# Patient Record
Sex: MALE | Race: WHITE | NOT HISPANIC OR LATINO | Employment: OTHER | URBAN - METROPOLITAN AREA
[De-identification: names, ages, dates, MRNs, and addresses within clinical notes are randomized per-mention and may not be internally consistent; named-entity substitution may affect disease eponyms.]

---

## 2017-01-13 PROBLEM — I63.00 CEREBROVASCULAR ACCIDENT (CVA) DUE TO THROMBOSIS OF PRECEREBRAL ARTERY: Status: ACTIVE | Noted: 2017-01-13

## 2017-01-13 PROBLEM — R53.1 ACUTE RIGHT-SIDED WEAKNESS: Status: ACTIVE | Noted: 2017-01-13

## 2017-01-13 PROBLEM — F02.80 PARKINSON'S DISEASE DEMENTIA: Status: ACTIVE | Noted: 2017-01-13

## 2017-01-13 PROBLEM — G20.A1 PARKINSON'S DISEASE DEMENTIA: Status: ACTIVE | Noted: 2017-01-13

## 2017-01-13 PROBLEM — R29.6 FALLS FREQUENTLY: Status: ACTIVE | Noted: 2017-01-13

## 2017-01-13 PROBLEM — I63.9 CVA (CEREBRAL VASCULAR ACCIDENT): Status: ACTIVE | Noted: 2017-01-13

## 2017-01-14 PROBLEM — E78.6 LIPOPROTEIN DEFICIENCIES: Status: ACTIVE | Noted: 2017-01-14

## 2017-01-14 PROBLEM — E78.1 PURE HYPERGLYCERIDEMIA: Status: ACTIVE | Noted: 2017-01-14

## 2017-01-15 PROBLEM — I21.4 NON-ST ELEVATION (NSTEMI) MYOCARDIAL INFARCTION: Status: ACTIVE | Noted: 2017-01-15

## 2017-01-18 PROBLEM — E87.6 HYPOKALEMIA: Status: ACTIVE | Noted: 2017-01-18

## 2017-01-23 PROBLEM — I21.4 NON-ST ELEVATION (NSTEMI) MYOCARDIAL INFARCTION: Status: ACTIVE | Noted: 2017-01-23

## 2017-01-23 PROBLEM — E87.6 HYPOKALEMIA: Status: ACTIVE | Noted: 2017-01-23

## 2017-01-27 PROBLEM — F02.80 PARKINSON'S DISEASE DEMENTIA: Status: RESOLVED | Noted: 2017-01-13 | Resolved: 2017-01-27

## 2017-01-27 PROBLEM — R07.9 CHEST PAIN: Status: ACTIVE | Noted: 2017-01-27

## 2017-01-27 PROBLEM — I21.A1 NON-ST ELEVATION MYOCARDIAL INFARCTION (NSTEMI), TYPE 2: Status: ACTIVE | Noted: 2017-01-23

## 2017-01-27 PROBLEM — G20.A1 PARKINSON'S DISEASE DEMENTIA: Status: RESOLVED | Noted: 2017-01-13 | Resolved: 2017-01-27

## 2017-02-16 PROBLEM — R04.0 SEVERE EPISTAXIS: Status: ACTIVE | Noted: 2017-02-16

## 2017-02-16 PROBLEM — R04.0 ANTERIOR EPISTAXIS: Status: ACTIVE | Noted: 2017-02-16

## 2017-08-15 ENCOUNTER — OFFICE VISIT (OUTPATIENT)
Dept: NEUROLOGY | Facility: CLINIC | Age: 77
End: 2017-08-15
Payer: MEDICARE

## 2017-08-15 VITALS
HEIGHT: 70 IN | HEART RATE: 64 BPM | WEIGHT: 175.94 LBS | TEMPERATURE: 98 F | DIASTOLIC BLOOD PRESSURE: 53 MMHG | BODY MASS INDEX: 25.19 KG/M2 | RESPIRATION RATE: 17 BRPM | SYSTOLIC BLOOD PRESSURE: 109 MMHG

## 2017-08-15 DIAGNOSIS — F02.80 EARLY ONSET ALZHEIMER'S DEMENTIA WITHOUT BEHAVIORAL DISTURBANCE: ICD-10-CM

## 2017-08-15 DIAGNOSIS — G30.0 EARLY ONSET ALZHEIMER'S DEMENTIA WITHOUT BEHAVIORAL DISTURBANCE: ICD-10-CM

## 2017-08-15 DIAGNOSIS — R27.0 ATAXIA: ICD-10-CM

## 2017-08-15 DIAGNOSIS — I63.9 BRAINSTEM INFARCTION: Primary | ICD-10-CM

## 2017-08-15 PROBLEM — G20.A1 PARKINSON'S DISEASE DEMENTIA: Status: RESOLVED | Noted: 2017-01-13 | Resolved: 2017-08-15

## 2017-08-15 PROCEDURE — 1157F ADVNC CARE PLAN IN RCRD: CPT | Mod: ,,, | Performed by: PSYCHIATRY & NEUROLOGY

## 2017-08-15 PROCEDURE — 99999 PR PBB SHADOW E&M-EST. PATIENT-LVL III: CPT | Mod: PBBFAC,,, | Performed by: PSYCHIATRY & NEUROLOGY

## 2017-08-15 PROCEDURE — 99215 OFFICE O/P EST HI 40 MIN: CPT | Mod: S$PBB,,, | Performed by: PSYCHIATRY & NEUROLOGY

## 2017-08-15 PROCEDURE — 3074F SYST BP LT 130 MM HG: CPT | Mod: ,,, | Performed by: PSYCHIATRY & NEUROLOGY

## 2017-08-15 PROCEDURE — 3008F BODY MASS INDEX DOCD: CPT | Mod: ,,, | Performed by: PSYCHIATRY & NEUROLOGY

## 2017-08-15 PROCEDURE — 1126F AMNT PAIN NOTED NONE PRSNT: CPT | Mod: ,,, | Performed by: PSYCHIATRY & NEUROLOGY

## 2017-08-15 PROCEDURE — 99213 OFFICE O/P EST LOW 20 MIN: CPT | Mod: PBBFAC,PN | Performed by: PSYCHIATRY & NEUROLOGY

## 2017-08-15 PROCEDURE — 3078F DIAST BP <80 MM HG: CPT | Mod: ,,, | Performed by: PSYCHIATRY & NEUROLOGY

## 2017-08-15 PROCEDURE — 1159F MED LIST DOCD IN RCRD: CPT | Mod: ,,, | Performed by: PSYCHIATRY & NEUROLOGY

## 2017-08-15 RX ORDER — DONEPEZIL HYDROCHLORIDE 10 MG/1
5 TABLET, FILM COATED ORAL NIGHTLY
Qty: 90 TABLET | Refills: 3 | Status: SHIPPED | OUTPATIENT
Start: 2017-08-15 | End: 2017-10-06

## 2017-08-15 NOTE — ASSESSMENT & PLAN NOTE
Continue Plavix, Lipitor however given his high risk of falls and the duration of time since infarct will stop dual antiplatelet therapy.  DC aspirin

## 2017-08-15 NOTE — ASSESSMENT & PLAN NOTE
Still on starting doses of Aricept and Namenda.  We will increase Aricept from 5-10 mg at night.  As long as this is tolerated, recommend gradual titration of Namenda by 5 mg a week to a target of 10 mg twice a day which his maintenance dosing.    Agree with colleagues in neurology who saw him earlier in the year, I do not see any tremor, rigidity, or asymmetric parkinsonian symptoms to suggest Parkinson's disease.  Agree with discontinuation of carbidopa levodopa

## 2017-08-15 NOTE — PATIENT INSTRUCTIONS
We will stop the aspirin, and continue only on the plavix 75 mg once a day for your recent stroke    Be very careful about your balance - I suspect that in addition to pre-existing balance issues, the recent stroke has impaired your balance even more.  Should have supervision and assistance with all transfers.    Increase Aricept from 5 mg to 10 mg at night.  As long as this is tolerated, can increase the memantine as well in the future, to a target of 10 mg twice a day

## 2017-08-15 NOTE — LETTER
August 15, 2017      MORRIS Jefferson MD  [address] [facility] [address]           [facility]  [address] [facility] [address] [place] [address]  Phone: [phone]  Fax: [phone]          Patient: Brown García   MR Number: [id number]   YOB: 1940   Date of Visit: 8/15/2017       Dear Dr. MORRIS Jefferson:    Thank you for referring Brown García to me for evaluation. Attached you will find relevant portions of my assessment and plan of care.    If you have questions, please do not hesitate to call me. I look forward to following Brown García along with you.    Sincerely,    Devin Ayoub,     Enclosure  CC:  No Recipients    If you would like to receive this communication electronically, please contact [email or web address] or [phone] to request more information on [employer] access.    For providers and/or their staff who would like to refer a patient to [facility], please contact us through our one-stop-shop provider referral line, [facility] , at [phone].    If you feel you have received this communication in error or would no longer like to receive these types of communications, please e-mail [email or web address]

## 2017-08-15 NOTE — ASSESSMENT & PLAN NOTE
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

## 2017-08-15 NOTE — PROGRESS NOTES
Subjective:      8/15/2017       Patient ID: Brown García is a right handed 77 y.o.  male who presents for falls, dementia, muscle weakness, Parkinson's disease    History of Present Illness    Reviewed records in Epic.  I do not have any notes from his referring physician.  He is here today with his son.    The patient is a resident at [facility].  He was seen in the emergency room at [facility] on several occasions in May 2017 with falls.  CT scan of the brain was performed which showed diffuse atrophy, no acute findings; concerning region for possible microhemorrhage likely just a vascular structure.  Neurosurgery looked at that at the time.    Moved to [facility] in Nov 2016, prior to that was living alone, falling.  Seemed to be accelerating, unable to stop himself.  Dx with PD and placed on Sinemet. Son states he never had any tremor, and since beginning of the year taken off Sinemet.  No change.     Had MI, stroke in Jan 2017.  Stents, taken off sinemet at that time. Was seen by Dr. Lopez and Dr. Juárez, taken off sinemet, started on plavix, aricept and memantine.     Still has some falls, has a safety mat by his bed at .     Review of patient's allergies indicates:   Allergen Reactions    Pcn [penicillins]      Current Outpatient Prescriptions   Medication Sig Dispense Refill    atorvastatin (LIPITOR) 40 MG tablet Take 1 tablet (40 mg total) by mouth once daily. 90 tablet 3    busPIRone (BUSPAR) 2.5 MG Tab tablet Take 2.5 mg by mouth 2 (two) times daily.      butalbital-acetaminophen-caffeine -40 mg (FIORICET, ESGIC) -40 mg per tablet Take 1 tablet by mouth daily as needed for Pain.      carvedilol (COREG) 6.25 MG tablet Take 6.25 mg by mouth 2 (two) times daily with meals.      cholecalciferol, vitamin D3, (VITAMIN D3) 1,000 unit Chew Take 1 tablet by mouth once daily.      clopidogrel (PLAVIX) 75 mg tablet Take 1 tablet (75 mg total) by mouth once daily.       cyanocobalamin (VITAMIN B-12) 1000 MCG tablet Take 100 mcg by mouth once daily.      donepezil (ARICEPT) 10 MG tablet Take 0.5 tablets (5 mg total) by mouth every evening. 90 tablet 3    escitalopram oxalate (LEXAPRO) 10 MG tablet Take 10 mg by mouth once daily.      fenofibrate micronized (LOFIBRA) 200 MG Cap Take 200 mg by mouth daily with breakfast.      ferrous sulfate 325 (65 FE) MG EC tablet Take 325 mg by mouth 2 (two) times daily.      furosemide (LASIX) 20 MG tablet Take 1 tablet (20 mg total) by mouth once daily. 30 tablet 11    levothyroxine (SYNTHROID) 137 MCG Tab tablet Take 137 mcg by mouth before breakfast.      lisinopril (PRINIVIL,ZESTRIL) 2.5 MG tablet Take 2 tablets (5 mg total) by mouth once daily. 180 tablet 3    lorazepam (ATIVAN) 0.5 MG tablet Take 1 tablet (0.5 mg total) by mouth every 12 (twelve) hours as needed. 30 tablet 0    lubiprostone (AMITIZA) 24 MCG Cap Take 24 mcg by mouth every 12 (twelve) hours as needed.      melatonin 10 mg Tab Take 10 mg by mouth every evening.       memantine (NAMENDA) 5 MG Tab Take 1 tablet (5 mg total) by mouth every evening. 30 tablet 11    metformin (GLUCOPHAGE) 500 MG tablet Take 1,000 mg by mouth 2 (two) times daily with meals.       multivitamin capsule Take 1 capsule by mouth once daily.      ondansetron (ZOFRAN-ODT) 4 MG TbDL Take 8 mg by mouth every 8 (eight) hours as needed.      pantoprazole (PROTONIX) 40 MG tablet Take 1 tablet (40 mg total) by mouth once daily.      sodium chloride (SALINE NASAL MIST) 0.65 % nasal spray 1 spray by Nasal route every 4 (four) hours. (Patient taking differently: 1 spray by Nasal route 4 (four) times daily. )  12    temazepam (RESTORIL) 15 mg Cap Take 15 mg by mouth every evening.       No current facility-administered medications for this visit.        Past Medical History  Past Medical History:   Diagnosis Date    Anxiety     Cognitive communication deficit     Coronary atherosclerosis of native  coronary artery 3/27/2013    9/2000. CABG. 2/24/2005. Cath. [place]. 3V CAD. Patent LIMA to LAD. Patent SVG to OM1. Occluded RCA. LVEF 65%.    Depression     Diabetes mellitus type II 3/27/2013    Diagnosed 2002.    Dizziness and giddiness     Essential hypertension, benign 3/27/2013    Diagnosed 1990.    Frequent falls     Gastroparesis     GERD (gastroesophageal reflux disease)     Hx of CABG 3/27/2013    9/2000. CABG. 2/24/2005. Cath. [place]. 3V CAD. Patent LIMA to LAD. Patent SVG to OM1. Occluded RCA. LVEF 65%.    Hypercholesterolemia 3/27/2013    Incontinence of urine     Insomnia     Irritable bowel syndrome (IBS)     Parkinson's disease     Requires daily assistance for activities of daily living (ADL) and comfort needs     Stroke     thrombos    Thyroid disease     Unsteady gait        Past Surgical History  Past Surgical History:   Procedure Laterality Date    CARDIAC CATHETERIZATION      CORONARY ARTERY BYPASS GRAFT      HAND SURGERY  2009    KNEE SURGERY  1/2013    Left TKR       Family History  History reviewed. No pertinent family history.    Social History  Social History     Social History    Marital status:      Spouse name: N/A    Number of children: N/A    Years of education: N/A     Occupational History    Not on file.     Social History Main Topics    Smoking status: Never Smoker    Smokeless tobacco: Never Used    Alcohol use No    Drug use: No    Sexual activity: Not on file     Other Topics Concern    Not on file     Social History Narrative    No narrative on file        Review of Systems  Review of Systems   Constitutional: Positive for appetite change and unexpected weight change (loss). Negative for fatigue and fever.   HENT: Positive for nosebleeds. Negative for congestion, hearing loss, sinus pressure and trouble swallowing.    Eyes: Negative for pain and visual disturbance.   Respiratory: Negative for cough, shortness of breath and wheezing.     Cardiovascular: Negative for chest pain and palpitations.   Gastrointestinal: Positive for nausea and vomiting. Negative for abdominal pain, blood in stool and diarrhea.   Endocrine: Negative for cold intolerance and heat intolerance.   Genitourinary: Negative for difficulty urinating, hematuria and urgency.        Incontinence     Musculoskeletal: Negative for arthralgias, back pain, gait problem, myalgias and neck pain.   Skin: Negative for rash and wound.   Neurological: Negative for dizziness, seizures, weakness and numbness.   Hematological: Bruises/bleeds easily.   Psychiatric/Behavioral: Negative for decreased concentration, dysphoric mood and sleep disturbance.       Objective:        Vitals:    08/15/17 0855   BP: (!) 109/53   Pulse: 64   Resp: 17   Temp: 97.5 °F (36.4 °C)     Body mass index is 25.24 kg/m².  Neurologic Exam     Mental Status   Oriented to person.   Disoriented to place.   Disoriented to time.   Registration: recalls 3 of 3 objects. Recall of objects at 5 minutes: 0/3.   Attention: decreased. Concentration: decreased.   Speech: speech is normal   Level of consciousness: alert  Knowledge: poor.   Able to name object. Able to repeat. Normal comprehension.   In [facility]  Friday, October, 2091     Sensory Exam   Light touch normal.   Right arm vibration: normal  Left arm vibration: normal  Right leg vibration: decreased from toes  Left leg vibration: decreased from toes  Right leg pinprick: decreased from toes  Left leg pinprick: decreased from toes  Some decreased sharp/dull discrimination at the toes     Gait, Coordination, and Reflexes     Gait  Gait: wide-based    Coordination   Romberg: positive    Reflexes   Right brachioradialis: 0  Left brachioradialis: 0  Right biceps: 0  Left biceps: 0  Right triceps: 0  Left triceps: 0  Right patellar: 2+  Left patellar: 2+  Right achilles: 1+  Left achilles: 0Leans hard to the left with narrow base or walking  No  saccade on head thrust, no skew deviation       Physical Exam   Neurological: He has an abnormal Romberg Test.   Reflex Scores:       Tricep reflexes are 0 on the right side and 0 on the left side.       Bicep reflexes are 0 on the right side and 0 on the left side.       Brachioradialis reflexes are 0 on the right side and 0 on the left side.       Patellar reflexes are 2+ on the right side and 2+ on the left side.       Achilles reflexes are 1+ on the right side and 0 on the left side.  Psychiatric: His speech is normal.         Data Review:     Reviewed brain imaging including CT scans from May 2017, and MRI of the brain January 2017.  Reviewed inpatient neurology consultation records from Dr. Lopez and Dr. Carrington Queen    Assessment:       1. Brainstem infarction    2. Ataxia    3. Early onset Alzheimer's dementia without behavioral disturbance        Plan:         Problem List Items Addressed This Visit        Neuro    Brainstem infarction - Primary    Overview     Midline central medullary infarct         Current Assessment & Plan     Continue Plavix, Lipitor however given his high risk of falls and the duration of time since infarct will stop dual antiplatelet therapy.  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         Early onset Alzheimer's dementia without behavioral disturbance    Current Assessment & Plan     Still on starting doses of Aricept and Namenda.  We will increase Aricept from 5-10 mg at night.  As long as this is tolerated, recommend gradual titration of Namenda by 5 mg a week to a target of 10 mg twice a day which his maintenance dosing.    Agree with colleagues in neurology who saw him earlier in the year, I do not see any tremor, rigidity, or asymmetric parkinsonian symptoms to suggest Parkinson's disease.  Agree with  discontinuation of carbidopa levodopa           Other Visit Diagnoses    None.         Return in about 3 months (around 11/15/2017).        Patient information shared at the time of visit:  We will stop the aspirin, and continue only on the plavix 75 mg once a day for your recent stroke    Be very careful about your balance - I suspect that in addition to pre-existing balance issues, the recent stroke has impaired your balance even more.  Should have supervision and assistance with all transfers.    Increase Aricept from 5 mg to 10 mg at night.  As long as this is tolerated, can increase the memantine as well in the future, to a target of 10 mg twice a day    Thank you very much for the opportunity to assist in this patient's care.  If you have any questions or concerns, please do not hesitate to contact me at any time.     Sincerely,  Devin Ayoub, DO

## 2017-09-29 ENCOUNTER — OFFICE VISIT (OUTPATIENT)
Dept: OPTOMETRY | Facility: CLINIC | Age: 77
End: 2017-09-29
Payer: MEDICARE

## 2017-09-29 DIAGNOSIS — H25.13 NUCLEAR SCLEROSIS, BILATERAL: ICD-10-CM

## 2017-09-29 DIAGNOSIS — E11.9 DIABETES MELLITUS TYPE 2 WITHOUT RETINOPATHY: Primary | ICD-10-CM

## 2017-09-29 DIAGNOSIS — H40.039 ANATOMICAL NARROW ANGLE: ICD-10-CM

## 2017-09-29 DIAGNOSIS — H52.03 HYPEROPIA WITH ASTIGMATISM AND PRESBYOPIA, BILATERAL: ICD-10-CM

## 2017-09-29 DIAGNOSIS — H02.533 LID RETRACTION, RIGHT: ICD-10-CM

## 2017-09-29 DIAGNOSIS — H52.4 HYPEROPIA WITH ASTIGMATISM AND PRESBYOPIA, BILATERAL: ICD-10-CM

## 2017-09-29 DIAGNOSIS — H52.203 HYPEROPIA WITH ASTIGMATISM AND PRESBYOPIA, BILATERAL: ICD-10-CM

## 2017-09-29 DIAGNOSIS — H43.813 POSTERIOR VITREOUS DETACHMENT, BILATERAL: ICD-10-CM

## 2017-09-29 DIAGNOSIS — Z13.5 GLAUCOMA SCREENING: ICD-10-CM

## 2017-09-29 PROCEDURE — 92004 COMPRE OPH EXAM NEW PT 1/>: CPT | Mod: S$PBB,,, | Performed by: OPTOMETRIST

## 2017-09-29 PROCEDURE — 92015 DETERMINE REFRACTIVE STATE: CPT | Mod: ,,, | Performed by: OPTOMETRIST

## 2017-09-29 PROCEDURE — 99999 PR PBB SHADOW E&M-EST. PATIENT-LVL III: CPT | Mod: PBBFAC,,, | Performed by: OPTOMETRIST

## 2017-09-29 PROCEDURE — 99213 OFFICE O/P EST LOW 20 MIN: CPT | Mod: PBBFAC,PO | Performed by: OPTOMETRIST

## 2017-09-29 NOTE — PATIENT INSTRUCTIONS
"Early Cataracts--not visually significant for surgery consultation.    What Are Cataracts?  A clear lens in the eye focuses light. This lets the eye see images sharply. With age, the lens slowly becomes cloudy. The cloudy lens is a cataract. A cataract scatters light and makes it hard for the eye to focus. Cataracts often form in both eyes. But one lens may cloud faster than the other.      The Aging of Your Lens    Your lens may cloud so slowly that you don`t notice any vision changes at first. But as the cataract gets worse, the eye has a harder time focusing. In early stages, glasses may help you see better. As the lens gets cloudier, your doctor may recommend surgery to restore your vision.  FLASHES / FLOATERS / POSTERIOR VITREOUS DETACHMENT    Call the clinic if you have any further changes in symptoms.  Including:  Increased numbers of floaters or flashing lights, dimness or darkness that moves through or stays constant in your vision, or any pain in the eye (s).            DRY EYES:  Use Over The Counter artificial tears as needed for dry eye symptoms.  Some common brands include:  Systane, Optive, and Refresh.  These drops can be used as frequently as desired, but may be most helpful use during long periods of concentrated work.  For example, reading / working at the computer.  Avoid drops that "get redness out", as these contain medication that may further irritate the eyes.    ALLERGY EYES / SYMPTOMS:    Over the counter medications include--Zaditor and Alaway  Use as directed 1-2 drops daily for symptoms of itching / watering eyes.  These drops will not help for dry eye or exposure symptoms.    DIABETES AND THE EYE / DIABETIC RETINOPATHY    Diabetic retinopathy is a condition occurring in persons with diabetes, which causes progressive damage to the retina, the light sensitive lining at the back of the eye. It is a serious sight-threatening complication of diabetes.    Diabetic retinopathy is the result " of damage to the tiny blood vessels that nourish the retina. They leak blood and other fluids that cause swelling of retinal tissue and clouding of vision. The condition usually affects both eyes. The longer a person has diabetes, the more likely they will develop diabetic retinopathy. If left untreated, diabetic retinopathy can cause blindness.  There are two basic types of diabetic retinopathy:    Background or nonproliferative diabetic retinopathy (NPDR)  Nonproliferative diabetic retinopathy (NPDR) is the earliest stage of diabetic retinopathy. With this condition, damaged blood vessels in the retina begin to leak extra fluid and small amounts of blood into the eye. Sometimes, deposits of cholesterol or other fats from the blood may leak into the retina. Many people with diabetes have mild NPDR, which usually does not affect their vision. However, if their vision is affected, it is the result of macular edema and macular ischemia.    If vision is affected due to macular changes, a consult with a Retina Specialist may be advised.  This is an ophthalmologist that treats retina conditions, including diabetic retinopathy.     Proliferative diabetic retinopathy (PDR)  Proliferative diabetic retinopathy (PDR) mainly occurs when many of the blood vessels in the retina close, preventing enough blood flow. In an attempt to supply blood to the area where the original vessels closed, the retina responds by growing new blood vessels. This is called neovascularization. However, these new blood vessels are abnormal and do not supply the retina with proper blood flow. The new vessels are also often accompanied by scar tissue that may cause the retina to wrinkle or detach. PDR may cause more severe vision loss than NPDR because it can affect both central and peripheral vision.     A patient diagnosed with proliferative diabetic eye disease will be referred to a retinal specialist for consultation.    Often there are no visual  symptoms in the early stages of diabetic retinopathy. That is why our eye care professionals recommend that everyone with diabetes have a comprehensive dilated eye examination once a year. Early detection and treatment can limit the potential for significant vision loss from diabetic retinopathy.

## 2017-09-29 NOTE — PROGRESS NOTES
HPI     Annual Exam    Additional comments: DLE x 5 yrs (outside [facility])     ocular health exam              Diabetic Eye Exam    Additional comments: does not check BSL -- no meds, diet controlled           Blurred Vision    Additional comments: at both near & distance -- specs broken, wearing old   pair           Comments   Hemoglobin A1C       Date                     Value               Ref Range             Status                01/16/2017               5.4                 0.0 - 5.6 %           Final              Comment:    Reference Interval:  5.0 - 5.6 Normal   5.7 - 6.4 High Risk   > 6.5   Diabetic    Hgb A1c results are standardized based on the (NGSP) National     Glycohemoglobin Standardization Program.    Hemoglobin A1C levels are   related to mean serum/plasma glucose   during the preceding 2-3 months.              01/13/2017               5.6                 0.0 - 5.6 %           Final              Comment:    Reference Interval:  5.0 - 5.6 Normal   5.7 - 6.4 High Risk   > 6.5   Diabetic    Hgb A1c results are standardized based on the (NGSP) National     Glycohemoglobin Standardization Program.    Hemoglobin A1C levels are   related to mean serum/plasma glucose   during the preceding 2-3 months.         ----------         Last edited by Akilah Mack on 9/29/2017 10:30 AM. (History)        ROS     Positive for: Eyes    Negative for: Constitutional, Gastrointestinal, Neurological, Skin,   Genitourinary, Musculoskeletal, HENT, Endocrine, Cardiovascular,   Respiratory, Psychiatric, Allergic/Imm, Heme/Lymph    Last edited by ANITRA Ennis, OD on 9/29/2017 11:22 AM. (History)        Assessment /Plan     For exam results, see Encounter Report.    Diabetes mellitus type 2 without retinopathy    Nuclear sclerosis, bilateral    Posterior vitreous detachment, bilateral    Glaucoma screening    Anatomical narrow angle    Lid retraction, right    Hyperopia with astigmatism and presbyopia,  bilateral      1. No ret/ no csme, gave info, control glucose, annual DFE  2. Vis sig, not ready for consult OD>OS, gave info  3. RD precautions given  4. Not suspect  5. Open 1-2 OU, gonio next exam  6. 1-1.5 mm scl show superior OD, good lid closure, no inf retraction  Monitor  7. Updated specs rx, gave copy, fill prn    Discussed and educated patient on current findings /plan.  RTC 1 year, prn if any changes / issues

## 2017-11-10 ENCOUNTER — OFFICE VISIT (OUTPATIENT)
Dept: OTOLARYNGOLOGY | Facility: CLINIC | Age: 77
End: 2017-11-10
Payer: MEDICARE

## 2017-11-10 VITALS
WEIGHT: 183.44 LBS | SYSTOLIC BLOOD PRESSURE: 125 MMHG | DIASTOLIC BLOOD PRESSURE: 85 MMHG | HEIGHT: 70 IN | BODY MASS INDEX: 26.26 KG/M2 | HEART RATE: 94 BPM

## 2017-11-10 DIAGNOSIS — H61.23 BILATERAL IMPACTED CERUMEN: ICD-10-CM

## 2017-11-10 DIAGNOSIS — R04.0 LEFT-SIDED EPISTAXIS: Primary | ICD-10-CM

## 2017-11-10 PROCEDURE — 30901 CONTROL OF NOSEBLEED: CPT | Mod: PBBFAC,PO,LT | Performed by: NURSE PRACTITIONER

## 2017-11-10 PROCEDURE — 99214 OFFICE O/P EST MOD 30 MIN: CPT | Mod: PBBFAC,PO,25 | Performed by: NURSE PRACTITIONER

## 2017-11-10 PROCEDURE — 99999 PR PBB SHADOW E&M-EST. PATIENT-LVL IV: CPT | Mod: PBBFAC,,, | Performed by: NURSE PRACTITIONER

## 2017-11-10 PROCEDURE — 69210 REMOVE IMPACTED EAR WAX UNI: CPT | Mod: PBBFAC,PO | Performed by: NURSE PRACTITIONER

## 2017-11-10 PROCEDURE — 99213 OFFICE O/P EST LOW 20 MIN: CPT | Mod: 25,S$PBB,, | Performed by: NURSE PRACTITIONER

## 2017-11-10 PROCEDURE — 30901 CONTROL OF NOSEBLEED: CPT | Mod: S$PBB,LT,, | Performed by: NURSE PRACTITIONER

## 2017-11-10 PROCEDURE — 69210 REMOVE IMPACTED EAR WAX UNI: CPT | Mod: 51,S$PBB,, | Performed by: NURSE PRACTITIONER

## 2017-11-10 NOTE — PATIENT INSTRUCTIONS
Nosebleed (Adult)    Bleeding from the nose most commonly occurs because of injury or drying and cracking of the inner lining of the nose. Most nosebleeds are because of dry air or nose-picking. They can occur during a common cold or an allergy attack. They can also occur on a very hot day, or from dry air in the winter.  If the bleeding site is found, it may be cauterized. This means it is treated to cause a blood clot to form. This may be done with a chemical, heat, or electricity. If the bleeding continues after the site is cauterized, or if the site cannot be found, packing may be put in your nose. This is to apply pressure and stop the bleeding. The packing may be made of gauze or sponge. A small balloon catheter is sometimes used. These must be removed by your doctor. Some types of packing dissolve on their own.  Home care  · If packing was put in your nose, unless told otherwise, do not pull on it or try to remove it yourself. You will be given an appointment to have it removed. You may also have been given antibiotics to prevent a sinus infection. If so, finish all of the medicine.  · Do not blow your nose for 12 hours after the bleeding stops. This will allow a strong blood clot to form. Do not pick your nose. This may restart bleeding.  · Avoid drinking alcohol and hot liquids for the next 2 days. Alcohol or hot liquids in your mouth can dilate blood vessels in your nose. This can cause bleeding to start again.  · Do not take ibuprofen, naproxen, or medicines that contain aspirin. These thin the blood and may cause your nose to bleed. You may take acetaminophen for pain, unless another pain medicine was prescribed.  · If the bleeding starts again, sit up and lean forward to prevent swallowing blood. Pinch your nose tightly on both sides, as shown above, for 10 to 15 minutes. Time yourself. Dont release the pressure on your nose until 10 minutes is up. If bleeding does not stop, continue to pinch your  nose and call your healthcare provider or return to this facility.  · If you have a cold, allergies, or dry nasal membranes, lubricate the nasal passages. Apply a small amount of petroleum jelly inside the nose with a cotton swab twice a day (morning and night).  · Avoid overheating your home. This can dry the air and make your condition worse.  · Put a humidifier in the room where you sleep. This will add moisture to the air.  · Use a saline nasal spray to keep nasal passages moist.  · Do not pick your nose. Keep fingernails trimmed to decrease risk of bleeds.  · Do not smoke.  Follow-up care  Follow up with your healthcare provider, or as advised. Nasal packing should be rechecked or removed within 2 to 3 days.  When to seek medical advice  Call your healthcare provider right away if any of these occur.  · You have another nosebleed that you cannot control  · Dizziness, weakness, or fainting  · You become tired or confused  · Fever of 100.4ºF (38ºC) or higher, or as directed by your healthcare provider  · Headache  · Sinus or facial pain  · Shortness of breath or trouble breathing    Nose Bleed Instructions:  Ayr or Ocean or other brands nasal saline gel into nose four times daily to keep moist.  Bactroban ointment in nostril twice daily if ordered.  Do not sleep or sit for long periods of time under a ceiling fan or other source of aggressive airflow.  Use a humidifier in bedroom or any room in your home you spend long periods of time.  Engage in only light activity. No strenuous activity. No heavy lifting or straining.   Use a stool softener to avoid straining.   No bending over at the hips. Keep nose above your heart at all times.  Sneeze with an open mouth to reduce pressure from nose.   Avoid foods or drinks hot in temperature for at least 48 hours then progress slowly.  Avoid hot steamy showers or baths for one week.    Try to control risk factors for nose bleeds:   (1) Keep an eye on your blood pressure,  make sure it is not running high.   (2) Use caution with using any nasal sprays. If you must use them, insert them gently, not too far in, avoid irritating nasal membranes especially the septum.   (3) Keep the nose moist with Ponaris nasal emollient several times a day.  (4) If you can, limit your aspirin intake or other blood thinners as your doctor will allow.   (5) Avoid inserting anything into the nose to clean it; use only a fine saline mist followed by gentle blowing, repeat until clear.     If nose was cauterized, avoid blowing through or sneezing through that side of your nose for the next 4-5 days.

## 2017-11-10 NOTE — PROGRESS NOTES
Subjective:       Patient ID: Brown García is a 77 y.o. male.    Chief Complaint: Epistaxis    HPI   Patient returns today for epistaxis. He is on Plavix. No nasal sprays other than saline mist. BP is fine 125/85. He states he has only had one episode of epistaxis a week ago. He cannot recall which side.     Review of Systems   Constitutional: Negative.    HENT: Positive for nosebleeds.    Eyes: Negative.    Respiratory: Negative.    Cardiovascular: Negative.    Gastrointestinal: Negative.    Musculoskeletal: Negative.    Skin: Negative.    Neurological: Negative.    Hematological: Negative.    Psychiatric/Behavioral: Negative.        Objective:      Physical Exam   Constitutional: He is oriented to person, place, and time. Vital signs are normal. He appears well-developed and well-nourished. He is cooperative. He does not appear ill. No distress.   HENT:   Head: Normocephalic and atraumatic.   Right Ear: Hearing, tympanic membrane, external ear and ear canal normal. Tympanic membrane is not erythematous. No middle ear effusion.   Left Ear: Hearing, tympanic membrane, external ear and ear canal normal. Tympanic membrane is not erythematous.  No middle ear effusion.   Nose: Nose normal. No mucosal edema or rhinorrhea. Right sinus exhibits no maxillary sinus tenderness and no frontal sinus tenderness. Left sinus exhibits no maxillary sinus tenderness and no frontal sinus tenderness.   Mouth/Throat: Uvula is midline, oropharynx is clear and moist and mucous membranes are normal. Mucous membranes are not pale, not dry and not cyanotic. No oral lesions. No oropharyngeal exudate, posterior oropharyngeal edema or posterior oropharyngeal erythema.     SEPARATE PROCEDURE IN OFFICE:   Procedure: Removal of impacted cerumen, bilateral   Pre Procedure Diagnosis: Cerumen Impaction   Post Procedure Diagnosis: Cerumen Impaction   Verbal informed consent in regards to risk of trauma to ear canal, ear drum or hearing, discomfort  during procedure and/or inability to remove cerumen impaction in one session or unforeseen events or complications.   No anesthesia.     Procedure in detail:   Ear canal visualized bilateral with appropriate size ear speculum utilizing Operating Head Binocular Otomicroscope   Utilizing the following: Ring curet AU. The impacted cerumen of the ear canals was removed atraumatically. The TM and EAC were then inspected and found to be clear of wax. See description of TMs/EACs in PE above.   Complications: No   Condition: Improved/Good     Eyes: Conjunctivae, EOM and lids are normal. Pupils are equal, round, and reactive to light. Right eye exhibits no discharge. Left eye exhibits no discharge. No scleral icterus.   Neck: Trachea normal and normal range of motion. Neck supple. No tracheal deviation present. No thyroid mass and no thyromegaly present.   Cardiovascular: Normal rate.    Pulmonary/Chest: Effort normal. No stridor. No respiratory distress. He has no wheezes.   Musculoskeletal: Normal range of motion.   Lymphadenopathy:        Head (right side): No submental, no submandibular, no tonsillar, no preauricular and no posterior auricular adenopathy present.        Head (left side): No submental, no submandibular, no tonsillar, no preauricular and no posterior auricular adenopathy present.     He has no cervical adenopathy.        Right cervical: No superficial cervical and no posterior cervical adenopathy present.       Left cervical: No superficial cervical and no posterior cervical adenopathy present.   Neurological: He is alert and oriented to person, place, and time. He has normal strength. Coordination and gait normal.   Skin: Skin is warm, dry and intact. No lesion and no rash noted. He is not diaphoretic. No cyanosis. No pallor.   Psychiatric: He has a normal mood and affect. His speech is normal and behavior is normal. Judgment and thought content normal. Cognition and memory are normal.   Nursing note and  vitals reviewed.        SEPARATE PROCEDURE NOTE:    After verbal consent obtained, phenylephrine and xylocaine-soaked cotton indwelled in nasal cavity X 10-15 minutes. After removal, area inspected; superficial engorged capillary noted on left anterior nasal septum. AgNO3 applied to site without event. Pt tolerated well.      Assessment:     Left epistaxis    Bilateral cerumen impactions removed  Plan:     Tylenol prn pain.   Avoid blowing through or sneezing through cauterized side of nose for 4-5 days.   Handouts given and discussed on epistaxis and nasal humidification protocols. Daily use of saline drops or gel to prevent mucosal desiccation. Discussed Ponaris nasal emollient.   Return to clinic as needed for further episodes or any other ENT concerns.

## 2017-12-01 ENCOUNTER — OFFICE VISIT (OUTPATIENT)
Dept: CARDIOLOGY | Facility: CLINIC | Age: 77
End: 2017-12-01
Payer: MEDICARE

## 2017-12-01 VITALS
SYSTOLIC BLOOD PRESSURE: 92 MMHG | WEIGHT: 184.5 LBS | HEART RATE: 51 BPM | BODY MASS INDEX: 26.41 KG/M2 | HEIGHT: 70 IN | DIASTOLIC BLOOD PRESSURE: 55 MMHG

## 2017-12-01 DIAGNOSIS — I25.118 CORONARY ARTERY DISEASE OF NATIVE ARTERY OF NATIVE HEART WITH STABLE ANGINA PECTORIS: ICD-10-CM

## 2017-12-01 DIAGNOSIS — G30.0 EARLY ONSET ALZHEIMER'S DEMENTIA WITHOUT BEHAVIORAL DISTURBANCE: Primary | ICD-10-CM

## 2017-12-01 DIAGNOSIS — I10 HYPERTENSION, BENIGN: ICD-10-CM

## 2017-12-01 DIAGNOSIS — Z95.1 S/P CABG X 4: ICD-10-CM

## 2017-12-01 DIAGNOSIS — F02.80 EARLY ONSET ALZHEIMER'S DEMENTIA WITHOUT BEHAVIORAL DISTURBANCE: Primary | ICD-10-CM

## 2017-12-01 DIAGNOSIS — E78.00 HYPERCHOLESTEROLEMIA: ICD-10-CM

## 2017-12-01 PROBLEM — I21.4 NSTEMI (NON-ST ELEVATED MYOCARDIAL INFARCTION): Status: RESOLVED | Noted: 2017-01-23 | Resolved: 2017-12-01

## 2017-12-01 PROBLEM — R07.9 CHEST PAIN: Status: RESOLVED | Noted: 2017-01-27 | Resolved: 2017-12-01

## 2017-12-01 PROBLEM — R53.1 ACUTE RIGHT-SIDED WEAKNESS: Status: RESOLVED | Noted: 2017-01-13 | Resolved: 2017-12-01

## 2017-12-01 PROBLEM — E78.1 PURE HYPERGLYCERIDEMIA: Status: RESOLVED | Noted: 2017-01-14 | Resolved: 2017-12-01

## 2017-12-01 PROCEDURE — 99213 OFFICE O/P EST LOW 20 MIN: CPT | Mod: PBBFAC,PO | Performed by: INTERNAL MEDICINE

## 2017-12-01 PROCEDURE — 99999 PR PBB SHADOW E&M-EST. PATIENT-LVL III: CPT | Mod: PBBFAC,,, | Performed by: INTERNAL MEDICINE

## 2017-12-01 PROCEDURE — 99204 OFFICE O/P NEW MOD 45 MIN: CPT | Mod: S$PBB,,, | Performed by: INTERNAL MEDICINE

## 2017-12-01 NOTE — PROGRESS NOTES
Subjective:    Patient ID:  Brown García is a 77 y.o. male who presents for evaluation of new pt (new pt); nose bleeds; s/p cabg; and Hypertension      HPI77 yo with long hx of CAD with previous CABG. Had caths in January of 2017 with occluded CFX, RCA and LAD with patent LIMA to LAD but disease beyond the graft. Eventually had multivessel PCI with ISRA to CFX,OM and LAD.RCA had bridging collaterals.Echo showed EF of 55% with mild mitral regurgitation.Patient has not had cardiology follow up since discharged. Has moderate Alzheimer's and has been in nursing home. Seen today because of recurrent nose bleeds and wanted to see if he could stop the plavix. Denies any CP. Activity limited because of some degree of debility.      Review of Systems   Constitution: Positive for weakness and malaise/fatigue. Negative for decreased appetite, fever, weight gain and weight loss.   HENT: Positive for nosebleeds. Negative for hearing loss.    Eyes: Negative for visual disturbance.   Cardiovascular: Negative for chest pain, claudication, cyanosis, dyspnea on exertion, irregular heartbeat, leg swelling, near-syncope, orthopnea, palpitations, paroxysmal nocturnal dyspnea and syncope.   Respiratory: Negative for cough, hemoptysis, shortness of breath, sleep disturbances due to breathing, snoring and wheezing.    Endocrine: Negative for cold intolerance, heat intolerance, polydipsia and polyuria.   Hematologic/Lymphatic: Negative for adenopathy and bleeding problem. Does not bruise/bleed easily.   Skin: Negative for color change, itching, poor wound healing, rash and suspicious lesions.   Musculoskeletal: Positive for arthritis. Negative for back pain, falls, joint pain, joint swelling, muscle cramps, muscle weakness and myalgias.   Gastrointestinal: Negative for bloating, abdominal pain, change in bowel habit, constipation, flatus, heartburn, hematemesis, hematochezia, hemorrhoids, jaundice, melena, nausea and vomiting.  "  Genitourinary: Negative for bladder incontinence, decreased libido, frequency, hematuria, hesitancy and urgency.   Neurological: Negative for brief paralysis, difficulty with concentration, excessive daytime sleepiness, dizziness, focal weakness, headaches, light-headedness, loss of balance, numbness and vertigo.   Psychiatric/Behavioral: Positive for memory loss. Negative for altered mental status and depression. The patient does not have insomnia and is not nervous/anxious.    Allergic/Immunologic: Negative for environmental allergies, hives and persistent infections.        Objective:    Physical Exam   Constitutional: He is oriented to person, place, and time. He appears well-developed and well-nourished. No distress.   BP (!) 92/55 (BP Location: Right arm, Patient Position: Sitting, BP Method: Large (Automatic))   Pulse (!) 51   Ht 5' 10" (1.778 m)   Wt 83.7 kg (184 lb 8.4 oz)   BMI 26.48 kg/m²      HENT:   Head: Normocephalic and atraumatic.   Eyes: Conjunctivae and lids are normal. Pupils are equal, round, and reactive to light. Right eye exhibits no discharge. No scleral icterus.   Neck: Normal range of motion. Neck supple. No JVD present. No tracheal deviation present. No thyromegaly present.   Cardiovascular: Normal rate, regular rhythm, S1 normal, S2 normal, normal heart sounds and intact distal pulses.  Exam reveals no gallop and no friction rub.    No murmur heard.  Pulses:       Carotid pulses are 2+ on the right side, and 2+ on the left side.       Radial pulses are 2+ on the right side, and 2+ on the left side.        Femoral pulses are 2+ on the right side, and 2+ on the left side.       Popliteal pulses are 2+ on the right side, and 2+ on the left side.        Dorsalis pedis pulses are 2+ on the right side, and 2+ on the left side.        Posterior tibial pulses are 2+ on the right side, and 2+ on the left side.   Pulmonary/Chest: Effort normal and breath sounds normal. No respiratory " distress. He has no wheezes. He has no rales. He exhibits no tenderness.   Sternal scar.   Abdominal: Soft. Bowel sounds are normal. He exhibits no distension and no mass. There is no hepatosplenomegaly or hepatomegaly. There is no tenderness. There is no rebound and no guarding.   Musculoskeletal: Normal range of motion. He exhibits no edema or tenderness.   Lymphadenopathy:     He has no cervical adenopathy.   Neurological: He is alert and oriented to person, place, and time. He has normal reflexes. No cranial nerve deficit. Coordination normal.   Skin: Skin is warm and dry. No rash noted. He is not diaphoretic. No erythema. No pallor.   Psychiatric: He has a normal mood and affect. His speech is normal and behavior is normal. Judgment and thought content normal. Cognition and memory are normal.         Assessment:       1. Early onset Alzheimer's dementia without behavioral disturbance    2. Coronary artery disease of native artery of native heart with stable angina pectoris    3. Hypercholesterolemia    4. Hypertension, benign    5. S/P CABG x 4         Plan:     CAD and BP stable. At this point he can stop plavix but needs to continue ASA 81mg    Continue other meds    No orders of the defined types were placed in this encounter.    Return in about 6 months (around 6/1/2018).

## 2017-12-11 ENCOUNTER — OFFICE VISIT (OUTPATIENT)
Dept: OTOLARYNGOLOGY | Facility: CLINIC | Age: 77
End: 2017-12-11
Payer: MEDICARE

## 2017-12-11 VITALS
WEIGHT: 186.94 LBS | BODY MASS INDEX: 24.77 KG/M2 | DIASTOLIC BLOOD PRESSURE: 51 MMHG | HEIGHT: 73 IN | SYSTOLIC BLOOD PRESSURE: 102 MMHG

## 2017-12-11 DIAGNOSIS — R04.0 LEFT-SIDED EPISTAXIS: Primary | ICD-10-CM

## 2017-12-11 DIAGNOSIS — J31.0 OTHER CHRONIC RHINITIS: ICD-10-CM

## 2017-12-11 PROCEDURE — 30801 ABLATE INF TURBINATE SUPERF: CPT | Mod: PBBFAC,PO | Performed by: NURSE PRACTITIONER

## 2017-12-11 PROCEDURE — 99213 OFFICE O/P EST LOW 20 MIN: CPT | Mod: 25,S$PBB,, | Performed by: NURSE PRACTITIONER

## 2017-12-11 PROCEDURE — 99214 OFFICE O/P EST MOD 30 MIN: CPT | Mod: PBBFAC,PO | Performed by: NURSE PRACTITIONER

## 2017-12-11 PROCEDURE — 30906 REPEAT CONTROL OF NOSEBLEED: CPT | Mod: PBBFAC,PO | Performed by: NURSE PRACTITIONER

## 2017-12-11 PROCEDURE — 99999 PR PBB SHADOW E&M-EST. PATIENT-LVL IV: CPT | Mod: PBBFAC,,, | Performed by: NURSE PRACTITIONER

## 2017-12-11 PROCEDURE — 30906 REPEAT CONTROL OF NOSEBLEED: CPT | Mod: S$PBB,LT,, | Performed by: NURSE PRACTITIONER

## 2017-12-11 RX ORDER — ASPIRIN 81 MG/1
81 TABLET ORAL DAILY
COMMUNITY
End: 2019-01-01

## 2017-12-11 NOTE — PROGRESS NOTES
Subjective:       Patient ID: Brown García is a 77 y.o. male.    Chief Complaint: Epistaxis    HPI   Patient returns again today for epistaxis. He takes 81 mg ASA. His Plavix was discontinued by his cardiologist 3 days ago. No nasal sprays other than saline mist. BP is fine 102/51. He had nasal cauterization one month ago left side for epistaxis.     Review of Systems   Constitutional: Negative.    HENT: Positive for nosebleeds.    Eyes: Negative.    Respiratory: Negative.    Cardiovascular: Negative.    Gastrointestinal: Negative.    Musculoskeletal: Negative.    Skin: Negative.    Neurological: Negative.    Hematological: Negative.    Psychiatric/Behavioral: Negative.        Objective:      Physical Exam   Constitutional: He is oriented to person, place, and time. Vital signs are normal. He appears well-developed and well-nourished. He is cooperative. He does not appear ill. No distress.   HENT:   Head: Normocephalic and atraumatic.   Right Ear: Hearing, tympanic membrane, external ear and ear canal normal. Tympanic membrane is not erythematous. No middle ear effusion.   Left Ear: Hearing, tympanic membrane, external ear and ear canal normal. Tympanic membrane is not erythematous.  No middle ear effusion.   Nose: Nose normal. No mucosal edema or rhinorrhea. Right sinus exhibits no maxillary sinus tenderness and no frontal sinus tenderness. Left sinus exhibits no maxillary sinus tenderness and no frontal sinus tenderness.   Mouth/Throat: Uvula is midline, oropharynx is clear and moist and mucous membranes are normal. Mucous membranes are not pale, not dry and not cyanotic. No oral lesions. No oropharyngeal exudate, posterior oropharyngeal edema or posterior oropharyngeal erythema.   Eyes: Conjunctivae, EOM and lids are normal. Pupils are equal, round, and reactive to light. Right eye exhibits no discharge. Left eye exhibits no discharge. No scleral icterus.   Neck: Trachea normal and normal range of motion. Neck  supple. No tracheal deviation present. No thyroid mass and no thyromegaly present.   Cardiovascular: Normal rate.    Pulmonary/Chest: Effort normal. No stridor. No respiratory distress. He has no wheezes.   Musculoskeletal: Normal range of motion.   Lymphadenopathy:        Head (right side): No submental, no submandibular, no tonsillar, no preauricular and no posterior auricular adenopathy present.        Head (left side): No submental, no submandibular, no tonsillar, no preauricular and no posterior auricular adenopathy present.     He has no cervical adenopathy.        Right cervical: No superficial cervical and no posterior cervical adenopathy present.       Left cervical: No superficial cervical and no posterior cervical adenopathy present.   Neurological: He is alert and oriented to person, place, and time. He has normal strength. Coordination and gait normal.   Skin: Skin is warm, dry and intact. No lesion and no rash noted. He is not diaphoretic. No cyanosis. No pallor.   Psychiatric: He has a normal mood and affect. His speech is normal and behavior is normal. Judgment and thought content normal. Cognition and memory are normal.   Nursing note and vitals reviewed.      SEPARATE PROCEDURE NOTE:    After verbal consent obtained, phenylephrine and xylocaine-soaked cotton indwelled in nasal cavity X 10-15 minutes. After removal, area inspected; two separate locations were cauterized: first on left anterior nasal septum and a second location of anterior left middle turbinate. AgNO3 applied to site without event. Pt tolerated well.    Assessment:     Left epistaxis    Chronic rhinitis  Plan:     Tylenol prn pain.   Avoid blowing through or sneezing through cauterized side of nose for 4-5 days.   Handouts given and discussed on epistaxis and nasal humidification protocols. Daily use of saline drops or gel to prevent mucosal desiccation. Discussed Ponaris nasal emollient.   Return to clinic as needed for further  episodes or any other ENT concerns.

## 2018-01-01 ENCOUNTER — ANESTHESIA EVENT (OUTPATIENT)
Dept: ENDOSCOPY | Facility: HOSPITAL | Age: 78
End: 2018-01-01
Payer: MEDICARE

## 2018-01-01 ENCOUNTER — ANESTHESIA (OUTPATIENT)
Dept: ENDOSCOPY | Facility: HOSPITAL | Age: 78
End: 2018-01-01
Payer: MEDICARE

## 2018-01-01 ENCOUNTER — HOSPITAL ENCOUNTER (OUTPATIENT)
Facility: HOSPITAL | Age: 78
Discharge: HOME | End: 2018-11-15
Attending: INTERNAL MEDICINE | Admitting: INTERNAL MEDICINE
Payer: MEDICARE

## 2018-01-01 ENCOUNTER — OFFICE VISIT (OUTPATIENT)
Dept: GASTROENTEROLOGY | Facility: CLINIC | Age: 78
End: 2018-01-01
Payer: MEDICARE

## 2018-01-01 VITALS
SYSTOLIC BLOOD PRESSURE: 111 MMHG | BODY MASS INDEX: 33.36 KG/M2 | TEMPERATURE: 98 F | HEIGHT: 70 IN | HEART RATE: 91 BPM | RESPIRATION RATE: 12 BRPM | OXYGEN SATURATION: 99 % | DIASTOLIC BLOOD PRESSURE: 70 MMHG | WEIGHT: 233 LBS

## 2018-01-01 VITALS
HEIGHT: 72 IN | WEIGHT: 231.69 LBS | HEART RATE: 89 BPM | DIASTOLIC BLOOD PRESSURE: 85 MMHG | SYSTOLIC BLOOD PRESSURE: 145 MMHG | BODY MASS INDEX: 31.38 KG/M2

## 2018-01-01 DIAGNOSIS — Z86.010 HISTORY OF COLON POLYPS: ICD-10-CM

## 2018-01-01 DIAGNOSIS — R11.2 NON-INTRACTABLE VOMITING WITH NAUSEA, UNSPECIFIED VOMITING TYPE: Primary | ICD-10-CM

## 2018-01-01 DIAGNOSIS — Z87.19 HISTORY OF CHRONIC CONSTIPATION: ICD-10-CM

## 2018-01-01 DIAGNOSIS — K31.84 GASTROPARESIS: ICD-10-CM

## 2018-01-01 DIAGNOSIS — Z86.010 HX OF COLONIC POLYPS: ICD-10-CM

## 2018-01-01 PROCEDURE — 25000003 PHARM REV CODE 250: Mod: PO | Performed by: NURSE ANESTHETIST, CERTIFIED REGISTERED

## 2018-01-01 PROCEDURE — 43239 EGD BIOPSY SINGLE/MULTIPLE: CPT | Mod: 51,,, | Performed by: INTERNAL MEDICINE

## 2018-01-01 PROCEDURE — 37000008 HC ANESTHESIA 1ST 15 MINUTES: Mod: PO | Performed by: INTERNAL MEDICINE

## 2018-01-01 PROCEDURE — 88305 TISSUE EXAM BY PATHOLOGIST: CPT | Performed by: PATHOLOGY

## 2018-01-01 PROCEDURE — G0105 COLORECTAL SCRN; HI RISK IND: HCPCS | Mod: ,,, | Performed by: INTERNAL MEDICINE

## 2018-01-01 PROCEDURE — 88305 TISSUE EXAM BY PATHOLOGIST: CPT | Mod: 26,,, | Performed by: PATHOLOGY

## 2018-01-01 PROCEDURE — 00813 ANES UPR LWR GI NDSC PX: CPT | Mod: PO | Performed by: INTERNAL MEDICINE

## 2018-01-01 PROCEDURE — G0105 COLORECTAL SCRN; HI RISK IND: HCPCS | Mod: PO | Performed by: INTERNAL MEDICINE

## 2018-01-01 PROCEDURE — 37000009 HC ANESTHESIA EA ADD 15 MINS: Mod: PO | Performed by: INTERNAL MEDICINE

## 2018-01-01 PROCEDURE — 99214 OFFICE O/P EST MOD 30 MIN: CPT | Mod: S$PBB,,, | Performed by: NURSE PRACTITIONER

## 2018-01-01 PROCEDURE — 43239 EGD BIOPSY SINGLE/MULTIPLE: CPT | Mod: PO | Performed by: INTERNAL MEDICINE

## 2018-01-01 PROCEDURE — 63600175 PHARM REV CODE 636 W HCPCS: Mod: PO | Performed by: NURSE ANESTHETIST, CERTIFIED REGISTERED

## 2018-01-01 PROCEDURE — 27201012 HC FORCEPS, HOT/COLD, DISP: Mod: PO | Performed by: INTERNAL MEDICINE

## 2018-01-01 PROCEDURE — 25000003 PHARM REV CODE 250: Mod: PO | Performed by: INTERNAL MEDICINE

## 2018-01-01 PROCEDURE — D9220A PRA ANESTHESIA: Mod: ANES,,, | Performed by: ANESTHESIOLOGY

## 2018-01-01 PROCEDURE — 99999 PR PBB SHADOW E&M-EST. PATIENT-LVL V: CPT | Mod: PBBFAC,,, | Performed by: NURSE PRACTITIONER

## 2018-01-01 PROCEDURE — D9220A PRA ANESTHESIA: Mod: CRNA,,, | Performed by: NURSE ANESTHETIST, CERTIFIED REGISTERED

## 2018-01-01 PROCEDURE — 99215 OFFICE O/P EST HI 40 MIN: CPT | Mod: PBBFAC,PO | Performed by: NURSE PRACTITIONER

## 2018-01-01 RX ORDER — DOCUSATE SODIUM 100 MG/1
100 CAPSULE, LIQUID FILLED ORAL 2 TIMES DAILY
COMMUNITY

## 2018-01-01 RX ORDER — LIDOCAINE HCL/PF 100 MG/5ML
SYRINGE (ML) INTRAVENOUS
Status: DISCONTINUED | OUTPATIENT
Start: 2018-01-01 | End: 2018-01-01

## 2018-01-01 RX ORDER — POLYETHYLENE GLYCOL 3350 17 G/17G
17 POWDER, FOR SOLUTION ORAL DAILY
COMMUNITY
Start: 2018-01-01

## 2018-01-01 RX ORDER — AZITHROMYCIN 250 MG/1
TABLET, FILM COATED ORAL
COMMUNITY
Start: 2018-01-01 | End: 2018-01-01

## 2018-01-01 RX ORDER — SODIUM CHLORIDE, SODIUM LACTATE, POTASSIUM CHLORIDE, CALCIUM CHLORIDE 600; 310; 30; 20 MG/100ML; MG/100ML; MG/100ML; MG/100ML
INJECTION, SOLUTION INTRAVENOUS CONTINUOUS
Status: DISCONTINUED | OUTPATIENT
Start: 2018-01-01 | End: 2018-01-01 | Stop reason: HOSPADM

## 2018-01-01 RX ORDER — LEVOFLOXACIN 500 MG/1
TABLET, FILM COATED ORAL
COMMUNITY
Start: 2018-01-01 | End: 2018-01-01

## 2018-01-01 RX ORDER — PROPOFOL 10 MG/ML
VIAL (ML) INTRAVENOUS
Status: DISCONTINUED | OUTPATIENT
Start: 2018-01-01 | End: 2018-01-01

## 2018-01-01 RX ORDER — ADHESIVE BANDAGE
30 BANDAGE TOPICAL EVERY 12 HOURS PRN
COMMUNITY

## 2018-01-01 RX ORDER — GLYCOPYRROLATE 0.2 MG/ML
INJECTION INTRAMUSCULAR; INTRAVENOUS
Status: DISCONTINUED | OUTPATIENT
Start: 2018-01-01 | End: 2018-01-01

## 2018-01-01 RX ORDER — SODIUM CHLORIDE 0.9 % (FLUSH) 0.9 %
3 SYRINGE (ML) INJECTION
Status: DISCONTINUED | OUTPATIENT
Start: 2018-01-01 | End: 2018-01-01 | Stop reason: HOSPADM

## 2018-01-01 RX ADMIN — PROPOFOL 30 MG: 10 INJECTION, EMULSION INTRAVENOUS at 12:11

## 2018-01-01 RX ADMIN — SODIUM CHLORIDE, SODIUM LACTATE, POTASSIUM CHLORIDE, AND CALCIUM CHLORIDE: .6; .31; .03; .02 INJECTION, SOLUTION INTRAVENOUS at 11:11

## 2018-01-01 RX ADMIN — PROPOFOL 30 MG: 10 INJECTION, EMULSION INTRAVENOUS at 11:11

## 2018-01-01 RX ADMIN — GLYCOPYRROLATE 0.2 MG: 0.2 INJECTION, SOLUTION INTRAMUSCULAR; INTRAVENOUS at 11:11

## 2018-01-01 RX ADMIN — LIDOCAINE HYDROCHLORIDE 50 MG: 20 INJECTION, SOLUTION INTRAVENOUS at 11:11

## 2018-01-01 RX ADMIN — PROPOFOL 100 MG: 10 INJECTION, EMULSION INTRAVENOUS at 11:11

## 2018-02-02 ENCOUNTER — TELEPHONE (OUTPATIENT)
Dept: OPTOMETRY | Facility: CLINIC | Age: 78
End: 2018-02-02

## 2018-02-02 NOTE — TELEPHONE ENCOUNTER
----- Message from Sachin Kiran sent at 2/2/2018  1:03 PM CST -----  Contact: Son/Rigo Sierra called in regarding the attached patient (dad) and wanted to see if patients eyeglass Rx could be faxed over to [facility].  Fax number is [phone]    Rigo's call back number is [phone]

## 2018-02-27 ENCOUNTER — OFFICE VISIT (OUTPATIENT)
Dept: NEUROLOGY | Facility: CLINIC | Age: 78
End: 2018-02-27
Payer: MEDICARE

## 2018-02-27 VITALS
RESPIRATION RATE: 18 BRPM | SYSTOLIC BLOOD PRESSURE: 94 MMHG | WEIGHT: 195.31 LBS | HEIGHT: 73 IN | DIASTOLIC BLOOD PRESSURE: 51 MMHG | HEART RATE: 55 BPM | BODY MASS INDEX: 25.88 KG/M2

## 2018-02-27 DIAGNOSIS — R27.0 ATAXIA: ICD-10-CM

## 2018-02-27 DIAGNOSIS — F02.80 EARLY ONSET ALZHEIMER'S DEMENTIA WITHOUT BEHAVIORAL DISTURBANCE: Primary | ICD-10-CM

## 2018-02-27 DIAGNOSIS — I63.9 BRAINSTEM INFARCTION: ICD-10-CM

## 2018-02-27 DIAGNOSIS — G30.0 EARLY ONSET ALZHEIMER'S DEMENTIA WITHOUT BEHAVIORAL DISTURBANCE: Primary | ICD-10-CM

## 2018-02-27 PROCEDURE — 99213 OFFICE O/P EST LOW 20 MIN: CPT | Mod: PBBFAC,PN | Performed by: PSYCHIATRY & NEUROLOGY

## 2018-02-27 PROCEDURE — 99213 OFFICE O/P EST LOW 20 MIN: CPT | Mod: S$PBB,,, | Performed by: PSYCHIATRY & NEUROLOGY

## 2018-02-27 PROCEDURE — 99999 PR PBB SHADOW E&M-EST. PATIENT-LVL III: CPT | Mod: PBBFAC,,, | Performed by: PSYCHIATRY & NEUROLOGY

## 2018-02-27 RX ORDER — LISINOPRIL 5 MG/1
TABLET ORAL
COMMUNITY
Start: 2018-02-08 | End: 2019-01-01

## 2018-02-27 RX ORDER — FUROSEMIDE 20 MG/1
20 TABLET ORAL DAILY
Status: ON HOLD | COMMUNITY
Start: 2018-02-05 | End: 2019-01-01 | Stop reason: HOSPADM

## 2018-02-27 RX ORDER — MEMANTINE HYDROCHLORIDE 10 MG/1
10 TABLET ORAL 2 TIMES DAILY
COMMUNITY
Start: 2018-02-07

## 2018-02-27 RX ORDER — ATORVASTATIN CALCIUM 40 MG/1
40 TABLET, FILM COATED ORAL DAILY
COMMUNITY
Start: 2018-02-14

## 2018-02-27 RX ORDER — ONDANSETRON HYDROCHLORIDE 8 MG/1
8 TABLET, FILM COATED ORAL EVERY 8 HOURS PRN
COMMUNITY
Start: 2018-01-02

## 2018-02-27 NOTE — PROGRESS NOTES
Subjective:          Patient ID: Brown García is a 77 y.o.  male who presents for follow up of falls, dementia, muscle weakness and medullary stroke    8/15/17 Initial / Last History of Present Illness:    Reviewed records in Epic.  I do not have any notes from his referring physician.  He is here today with his son.     The patient is a resident at [facility].  He was seen in the emergency room at [facility] on several occasions in May 2017 with falls.  CT scan of the brain was performed which showed diffuse atrophy, no acute findings; concerning region for possible microhemorrhage likely just a vascular structure.  Neurosurgery looked at that at the time.     Moved to [facility] in Nov 2016, prior to that was living alone, falling.  Seemed to be accelerating, unable to stop himself.  Dx with PD and placed on Sinemet. Son states he never had any tremor, and since beginning of the year taken off Sinemet.  No change.      Had MI, stroke in Jan 2017.  Stents, taken off sinemet at that time. Was seen by Dr. Lopez and Dr. Juárez, taken off sinemet, started on plavix, aricept and memantine.      Still has some falls, has a safety mat by his bed at .     2/27/18 Interval history since last visit:    At her last visit, we discontinued aspirin and continued on Plavix monotherapy for recent stroke, talked about fall precautions and balance impairment, and increased Aricept from 5-10 mg for dementia.as on previous visits with neurology, I did not see any signs of Parkinson's at that time and had him stop carbidopa levodopa.      No issues since stopping the carbi/levo    Repeating more often, recognizes family.  May be disoriented, thinks he just recently got to [facility].  Will forget recent meals.      No hallucinations.  No major falls, had a slide out of bed recently but has cushioning, and a low bed.  Is getting back in to PT.      Was recently cauterized for nosebleeds, last visit had  stopped plavix and had also recently held the aspirin    Review of patient's allergies indicates:   Allergen Reactions    Pcn [penicillins]      Current Outpatient Prescriptions   Medication Sig Dispense Refill    aspirin (ECOTRIN) 81 MG EC tablet Take 81 mg by mouth once daily.      atorvastatin (LIPITOR) 40 MG tablet       busPIRone (BUSPAR) 2.5 MG Tab tablet Take 2.5 mg by mouth 2 (two) times daily.      butalbital-acetaminophen-caffeine -40 mg (FIORICET, ESGIC) -40 mg per tablet Take 1 tablet by mouth daily as needed for Pain.      carvedilol (COREG) 6.25 MG tablet Take 6.25 mg by mouth 2 (two) times daily with meals.      cholecalciferol, vitamin D3, (VITAMIN D3) 1,000 unit Chew Take 1 tablet by mouth once daily.      cyanocobalamin (VITAMIN B-12) 1000 MCG tablet Take 1,000 mcg by mouth once daily.       escitalopram oxalate (LEXAPRO) 10 MG tablet Take 10 mg by mouth once daily.      fenofibrate micronized (LOFIBRA) 200 MG Cap Take 200 mg by mouth daily with breakfast.      ferrous sulfate 325 (65 FE) MG EC tablet Take 325 mg by mouth 2 (two) times daily.      furosemide (LASIX) 20 MG tablet       levothyroxine (SYNTHROID) 137 MCG Tab tablet Take 137 mcg by mouth before breakfast.      lisinopril (PRINIVIL,ZESTRIL) 5 MG tablet       lubiprostone (AMITIZA) 24 MCG Cap Take 24 mcg by mouth every 12 (twelve) hours as needed.      melatonin 10 mg Tab Take 10 mg by mouth every evening.       memantine (NAMENDA) 10 MG Tab       multivitamin capsule Take 1 capsule by mouth once daily.      ondansetron (ZOFRAN) 8 MG tablet       sodium chloride (SALINE NASAL MIST) 0.65 % nasal spray 1 spray by Nasal route every 4 (four) hours. (Patient taking differently: 1 spray by Nasal route 4 (four) times daily. )  12    temazepam (RESTORIL) 15 mg Cap Take 15 mg by mouth every evening.      ondansetron (ZOFRAN-ODT) 4 MG TbDL Take 8 mg by mouth every 8 (eight) hours as needed.       No current  facility-administered medications for this visit.          Review of Systems  Review of Systems    Objective:        Vitals:    02/27/18 1600   BP: (!) 94/51   Pulse: (!) 55   Resp: 18     Body mass index is 25.77 kg/m².  Neurologic Exam     Mental Status   Oriented to person.   Oriented to place.   Disoriented to time.   Registration: recalls 3 of 3 objects (2 trials ). Recall of objects at 5 minutes: 0/3.   Attention: decreased. Concentration: decreased.   Speech: speech is normal   Month = March, year = 70 something       Physical Exam   Psychiatric: His speech is normal.         Data Review:    Assessment:        1. Early onset Alzheimer's dementia without behavioral disturbance    2. Brainstem infarction    3. Ataxia           Plan:         Problem List Items Addressed This Visit        Neuro    Brainstem infarction    Overview     Midline central medullary infarct         Ataxia    Early onset Alzheimer's dementia without behavioral disturbance - Primary          Follow-up in about 4 months (around 6/27/2018).       Thank you very much for the opportunity to assist in this patient's care.  If you have any questions or concerns, please do not hesitate to contact me at any time.     Sincerely,  Devin Ayoub, DO

## 2018-10-24 NOTE — PATIENT INSTRUCTIONS
"  Vomiting (Adult)  Vomiting is a common symptom that may be due to different causes. These include gastroenteritis ("stomach flu"), food poisoning and gastritis. There are other more serious causes of vomiting which may be hard to diagnose early in the illness. Therefore, it is important to watch for the warning signs listed below.  The main danger from repeated vomiting is dehydration. This is due to excess loss of water and minerals from the body. When this occurs, body fluids must be replaced.  Home care  · If symptoms are severe, rest at home for the next 24 hours.  · Because your symptoms may be from an infection, wash your hands frequently and well, and use alcohol-based  to avoid spreading the infection to others.  · Wash your hands for at least 20 seconds. Hum the happy birthday song twice for the correct length of time.  · Wash your hands after using the toilet, before and after preparing food, before eating food, after changing a diaper, cleaning a wound, caring for a sick person, and blowing your nose, coughing, or sneezing. You should also wash your hands after caring for someone who is sick, touching pet food, or treats, and touching an animal, or animal waste.  · You may use acetaminophen or NSAID medicines like ibuprofen or naproxen to control fever, unless another medicine was prescribed. If you have chronic liver or kidney disease or ever had a stomach ulcer or GI bleeding, talk with your doctor before using these medicines. Aspirin should never be used in anyone under 18 years of age who is ill with a fever. It may cause severe liver damage. Don't use NSAID medicines if you are already taking one for another condition (like arthritis) or are on aspirin (such as for heart disease, or after a stroke)  · Avoid tobacco and alcohol use, which may worsen your symptoms.  · If medicines for vomiting were prescribed, take as directed.  · Once vomiting stops, then follow these guidelines:  During " the first 12 to 24 hours follow the diet below:  · Fruit juices. Apple, grape juice, clear fruit drinks, and electrolyte replacement drinks.  · Beverages. Soft drinks without caffeine; mineral water (plain or flavored), decaffeinated tea and coffee.  · Soups. Clear broth, consommé and bouillon  · Desserts. Plain gelatin, popsicles and fruit juice bars. As you feel better, you may add 6-8 ounces of yogurt per day.  During the next 24 hours you may add the following to the above:  · Hot cereal, plain toast, bread, rolls, crackers  · Plain noodles, rice, mashed potatoes, chicken noodle or rice soup  · Unsweetened canned fruit (avoid pineapple), bananas  · Limit caffeine and chocolate. No spices or seasonings except salt.  During the next 24 hours:  Gradually resume a normal diet, as you feel better and your symptoms lessen.  Follow-up care  Follow up with your healthcare provider, or as advised.  When to seek medical advice  Call your healthcare provider right away if any of these occur:  · Constant right-sided lower abdominal pain or increasing general abdominal pain  · Continued vomiting (unable to keep liquids down) for 24 hours  · Frequent diarrhea (more than 5 times a day); blood (red or black color) or mucus in diarrhea  · Reduced urine output or extreme thirst  · Weakness, dizziness or fainting  · Unusually drowsy or confused  · Fever of 100.4°F (38°C) oral or higher, or as directed  · Yellow color of the eyes or skin  Date Last Reviewed: 11/16/2015 © 2000-2017 [employer]. [address] [place] [address], [place] [address]. All rights reserved. This information is not intended as a substitute for professional medical care. Always follow your healthcare professional's instructions.        Gastroparesis     Gastroparesis means that food and fluids move too slowly out of the stomach into the duodenum.   Gastroparesis (also called delayed gastric emptying) happens when the stomach takes longer than normal to  empty of food. This is due to a problem with motility (the movement of the muscles in the digestive tract). For many people, gastroparesis is a lifelong condition. But treatment can help relieve symptoms and prevent complications. Read on to learn more about gastroparesis and how it can be managed.  How gastroparesis develops  With normal motility, signals from nerves tell the stomach muscles when to contract. These muscles move food from the stomach into the duodenum (the first part of the small bowel). With gastroparesis, the nerves or muscles are damaged. This causes motility to slow down or stop completely. As a result, food cannot move from the stomach properly. This delayed emptying can cause nausea, vomiting, and other symptoms. Malnutrition can result. Bezoars (hardened lumps of food) can form in the stomach and cause other complications as well.  Causes of gastroparesis  Gastroparesis can be caused by any of the following:  · Diabetes  · Surgery involving any of the digestive organs, such as the stomach and bowels  · Certain medicines, such as strong pain medicines (narcotics)  · Certain conditions, such as systemic scleroderma, Parkinson disease, and thyroid disease  · After a viral illness  In many cases, the cause of gastroparesis cannot be found.  Signs and symptoms of gastroparesis  These can include:  · Nausea and vomiting  · Feeling full quickly when eating  · Belly pain  · Heartburn  · Belly bloating  · Weight loss  · Loss of appetite  · High and low blood sugar levels (in people with diabetes)  Diagnosing gastroparesis  Your healthcare provider will ask about your symptoms and health history. Youll also be examined. In addition, blood tests and X-rays are often done to check your health and rule out other problems. To confirm the problem, you may need other tests as well. These can include:  · Upper endoscopy. This is done to see inside the stomach and duodenum. For the test, an endoscope is used.  This is a thin, flexible tube with a tiny camera on the end. Its inserted through the mouth and down into the stomach and duodenum.  · Upper gastrointestinal (GI) series. This is done to take X-rays of the upper GI tract from the mouth to the small bowel. For the test, a substance called barium is used. The barium coats the upper GI tract so that it will show up clearly on X-rays.  · Gastric emptying scan. This is done to measure how quickly food leaves the stomach. For the test, a meal containing a harmless radioactive substance (tracer) is eaten. Then scans of the stomach are done. The tracer shows up clearly on the scans and shows the movement of the food through the stomach.  · Antroduodenal manometry. This test gives pressure measurements of the stomach and small intestine to check how the contractions are working.  · Newer tests. These are being created and include breath tests and wireless capsule studies   Treating gastroparesis  The goal of treatment is to help you manage your condition. Treatment may include one or more of the following:  · Dietary changes. You may need to make changes to your eating habits and daily diet. For instance, your healthcare provider may instruct you to eat small meals throughout the day. Doing this can keep you from feeling full too quickly. You may be placed on a liquid or soft diet. This means youll eat liquid foods or foods that are mashed or put through a . In addition, you may need to avoid foods high in fats and fiber. These can slow digestion. For more help with your diet, your healthcare provider can refer you to a dietitian. In severe cases, you may need a feeding tube. This sends liquid food or medicine directly to your small bowel, bypassing the stomach.  · Treating diabetes. If you are diabetic, it is important to control your blood sugar. High sugar levels worsen gastroparesis.   · Medicines. These can help manage symptoms, such as nausea and vomiting.  They can also improve motility. Each medicine has specific risks and side effects. Your doctor can tell you more about any medicine that is prescribed for you.  · Surgery. You may need to have a tube surgically inserted into the stomach. The tube removes excess air and fluid. This can relieve severe symptoms of nausea and vomiting. In rare cases, other surgery may be needed on the stomach or small bowel. This is to create a new passageway for food to be emptied from the stomach.  · Gastric electrical stimulation. This treatment is done less often and may not be available. Your healthcare provider can tell you more about this treatment if it is a choice for you.  Diabetes and gastroparesis  If you have diabetes, gastroparesis can make it harder to manage your blood sugar level. Youll need to take extra steps in your treatment to prevent complications. Work with your healthcare provider to learn what you can do to protect your health. For more information, contact the American Diabetes Association, www.diabetes.org.   Long-term concerns   With treatment, most people can manage their symptoms and maintain their usual routines. If your symptoms are moderate to severe, you may need to see your healthcare provider more often for checkups. Also, other treatments will likely be needed.  Date Last Reviewed: 7/14/2016  © 2061-1990 [employer] [address] [place] [address], [place] [address]. All rights reserved. This information is not intended as a substitute for professional medical care. Always follow your healthcare professional's instructions.

## 2018-10-24 NOTE — PROGRESS NOTES
Subjective:       Patient ID: Brown García is a 78 y.o. male Body mass index is 31.42 kg/m².    Chief Complaint: Nausea (vomiting)    This patient is new to me.    Patient is here with caregiver, she did not assist with medical history. Patient and caregiver at clinic are both poor historian; the caregiver at appointment gave me the phone number to contact the caregiver at the [facility] that would know more information: [phone]. Patient is a resident at [facility]. I contacted [facility] and spoke to one of his caregivers, Gris, whom knew more about the reason for his GI appointment. She reports the patient was having episodes of nausea and vomiting a few weeks back. The patient has improved recently. She reports no nausea or vomiting for the past week.      Nausea   This is a new problem. Episode onset: started within the last month. The problem has been resolved (patient denies nausea or vomiting). Pertinent negatives include no abdominal pain, chest pain, fever, nausea or vomiting (patient denies). Associated symptoms comments: Bowel movements once every 2 days. Treatments tried: zofran prn; amitiza 24 mcg BID PRN- staff reports this is rarely given, glycolax daily, colace BID- staff reports this was added recently & has helped, milk of magnesia PRN. The treatment provided significant relief.     Review of Systems   Constitutional: Negative for appetite change and fever.   HENT: Negative for trouble swallowing.    Respiratory: Negative for shortness of breath.    Cardiovascular: Negative for chest pain.   Gastrointestinal: Positive for constipation (history of this) and diarrhea (occasional; denies currently). Negative for abdominal pain, anal bleeding, blood in stool, nausea and vomiting (patient denies).       Past Medical History:   Diagnosis Date    Anxiety     Cognitive communication deficit     Colon polyp     Coronary atherosclerosis of native coronary artery 3/27/2013    9/2000. CABG.  2/24/2005. Cath. [facility]. 3V CAD. Patent LIMA to LAD. Patent SVG to OM1. Occluded RCA. LVEF 65%.    Depression     Diabetes mellitus type II 3/27/2013    Diagnosed 2002.    Dizziness and giddiness     Essential hypertension, benign 3/27/2013    Diagnosed 1990.    Frequent falls     Gastroparesis     GERD (gastroesophageal reflux disease)     Hx of CABG 3/27/2013    9/2000. CABG. 2/24/2005. Cath. [facility]. 3V CAD. Patent LIMA to LAD. Patent SVG to OM1. Occluded RCA. LVEF 65%.    Hypercholesterolemia 3/27/2013    Incontinence of urine     Insomnia     Irritable bowel syndrome (IBS)     Parkinson's disease     Requires daily assistance for activities of daily living (ADL) and comfort needs     Stroke     thrombos    Thyroid disease     Unsteady gait      Past Surgical History:   Procedure Laterality Date    CARDIAC CATHETERIZATION      COLONOSCOPY  09/02/2003    GIULIA Fernández, in legacy: colon polyps removed, poor prep, hemorrhoids; repeat in 1 year, biopsy: adenomatous polyps    CORONARY ARTERY BYPASS GRAFT      HAND SURGERY  2009    HEART CATH-LEFT Left 1/16/2017    Performed by [facility] at [facility] CATH    KNEE SURGERY  1/2013    Left TKR    [id number] # [id number] Left 1/18/2017    Performed by Fausto Miner MD at [facility] CATH     Family History   Problem Relation Age of Onset    Colon cancer Neg Hx     Colon polyps Neg Hx     Crohn's disease Neg Hx     Ulcerative colitis Neg Hx     Stomach cancer Neg Hx     Esophageal cancer Neg Hx      Wt Readings from Last 10 Encounters:   10/24/18 105.1 kg (231 lb 11.3 oz)   07/30/18 99.8 kg (220 lb)   02/27/18 88.6 kg (195 lb 4.8 oz)   12/11/17 84.8 kg (186 lb 15.2 oz)   12/01/17 83.7 kg (184 lb 8.4 oz)   11/10/17 83.2 kg (183 lb 6.8 oz)   08/15/17 79.8 kg (175 lb 14.8 oz)   05/12/17 83.7 kg (184 lb 9.6 oz)   05/09/17 83.5 kg (184 lb)   05/08/17 90.7 kg (200 lb)     Lab Results   Component Value Date    WBC 6.96 10/06/2017    HGB 11.2  (L) 10/06/2017    HCT 34.1 (L) 10/06/2017    MCV 81 (L) 10/06/2017     10/06/2017     CMP  Sodium   Date Value Ref Range Status   10/06/2017 142 136 - 145 mmol/L Final     Potassium   Date Value Ref Range Status   10/06/2017 4.2 3.5 - 5.1 mmol/L Final     Chloride   Date Value Ref Range Status   10/06/2017 103 95 - 110 mmol/L Final     CO2   Date Value Ref Range Status   10/06/2017 29 22 - 31 mmol/L Final     Glucose   Date Value Ref Range Status   10/06/2017 98 70 - 110 mg/dL Final     Comment:     The ADA recommends the following guidelines for fasting glucose:  Normal:       less than 100 mg/dL  Prediabetes:  100 mg/dL to 125 mg/dL  Diabetes:     126 mg/dL or higher       BUN, Bld   Date Value Ref Range Status   10/06/2017 31 (H) 9 - 21 mg/dL Final     Creatinine   Date Value Ref Range Status   10/06/2017 1.08 0.50 - 1.40 mg/dL Final     Calcium   Date Value Ref Range Status   10/06/2017 9.4 8.4 - 10.2 mg/dL Final     Total Protein   Date Value Ref Range Status   05/08/2017 7.3 6.0 - 8.4 g/dL Final     Albumin   Date Value Ref Range Status   05/08/2017 4.5 3.5 - 5.2 g/dL Final     Total Bilirubin   Date Value Ref Range Status   05/08/2017 0.8 0.2 - 1.3 mg/dL Final     Comment:     For infants and newborns, interpretation of results should be based  on gestational age, weight and in agreement with clinical  observations.  Premature Infant recommended reference ranges:  Up to 24 hours.............<8.0 mg/dL  Up to 48 hours............<12.0 mg/dL  3-5 days..................<15.0 mg/dL  6-29 days.................<15.0 mg/dL       Alkaline Phosphatase   Date Value Ref Range Status   05/08/2017 61 38 - 145 U/L Final     AST   Date Value Ref Range Status   05/08/2017 34 17 - 59 U/L Final     ALT   Date Value Ref Range Status   05/08/2017 30 10 - 44 U/L Final     Anion Gap   Date Value Ref Range Status   10/06/2017 10 8 - 16 mmol/L Final     eGFR if    Date Value Ref Range Status   10/06/2017 >60  >60 mL/min/1.73 m^2 Final     eGFR if non    Date Value Ref Range Status   10/06/2017 >60 >60 mL/min/1.73 m^2 Final     Comment:     Calculation used to obtain the estimated glomerular filtration  rate (eGFR) is the CKD-EPI equation. Since race is unknown   in our information system, the eGFR values for   -American and Non--American patients are given   for each creatinine result.       Lab Results   Component Value Date    TSH 1.520 01/16/2017     Objective:      Physical Exam   Constitutional: He appears well-nourished. No distress.   Patient is in a wheelchair   Eyes: Conjunctivae are normal. Pupils are equal, round, and reactive to light. No scleral icterus.   Cardiovascular: Normal rate.   Pulmonary/Chest: Effort normal and breath sounds normal. No respiratory distress.   Abdominal: Soft. Bowel sounds are normal. He exhibits no distension and no mass. There is no tenderness. There is no rebound and no guarding.   Neurological: He is alert.   Skin: Skin is warm and dry. No pallor.   Psychiatric: He has a normal mood and affect. His speech is normal. He is slowed. Cognition and memory are impaired.   Nursing note and vitals reviewed.      Assessment:       1. Non-intractable vomiting with nausea, unspecified vomiting type    2. History of gastroparesis    3. History of colon polyps    4. History of chronic constipation        Plan:       Non-intractable vomiting with nausea, unspecified vomiting type  - schedule EGD, discussed procedure with patient and caregiver, caregiver verbalized understanding  - CONTINUE ZOFRAN AS DIRECTED PRN    History of gastroparesis  - schedule EGD, discussed procedure with patient and caregiver, caregiver verbalized understanding  Recommend small frequent meals instead of 3 big meals a day, low fat meals & low residue diet.  Avoid: high fiber (insoluble fiber), red meats, dairy, and non-digestible solids (peels, fruit pulp, etc). Avoid NSAIDs and  narcotics.  -possible gastric emptying study pending results of testing and if symptoms persist    History of colon polyps  - schedule Colonoscopy, discussed procedure with the patient and caregiver, caregiver verbalized understanding    History of chronic constipation  Recommended daily exercise as tolerated & adequate water intake (six 8-oz glasses of water daily).  - discussed with caregiver, Gris, that amitiza is a medication that should be taken daily to help with chronic constipation, not PRN; informed her that if patient's constipation persist/worsens, then recommend starting amitiza 24 mcg bid, Gris verbalized understanding.  -CONTINUE OTC stool softener such as Colace as directed  -CONTINUE MiraLax once daily (17g PO) as directed (AKA GLYCOLAX)  - If no improvement with above recommendations, try intermittently dosed Dulcolax OTC as directed (every 3-4  days) PRN to facilitate bowel movements  -If still no improvement with these measures, call/follow-up  - schedule Colonoscopy, discussed procedure with the patient and caregiver, caregiver verbalized understanding    Follow-up in about 1 month (around 11/24/2018), or if symptoms worsen or fail to improve.      If no improvement in symptoms or symptoms worsen, call/follow-up at clinic or go to ER.

## 2018-11-15 PROBLEM — Z86.010 HX OF COLONIC POLYPS: Status: ACTIVE | Noted: 2018-01-01

## 2018-11-15 NOTE — TRANSFER OF CARE
"Anesthesia Transfer of Care Note    Patient: Brown García    Procedure(s) Performed: Procedure(s) (LRB):  EGD (ESOPHAGOGASTRODUODENOSCOPY) (N/A)  COLONOSCOPY (N/A)    Patient location: PACU    Anesthesia Type: general    Transport from OR: Transported from OR on room air with adequate spontaneous ventilation    Post pain: adequate analgesia    Post assessment: no apparent anesthetic complications    Post vital signs: stable    Level of consciousness: sedated    Nausea/Vomiting: no nausea/vomiting    Complications: none    Transfer of care protocol was followed      Last vitals:   Visit Vitals  /70 (BP Location: Left arm, Patient Position: Lying)   Pulse 97   Temp 36.4 °C (97.5 °F) (Skin)   Resp 12   Ht 5' 10" (1.778 m)   Wt 105.7 kg (233 lb)   SpO2 96%   BMI 33.43 kg/m²     "

## 2018-11-15 NOTE — ANESTHESIA POSTPROCEDURE EVALUATION
"Anesthesia Post Evaluation    Patient: Brown García    Procedure(s) Performed: Procedure(s) (LRB):  EGD (ESOPHAGOGASTRODUODENOSCOPY) (N/A)  COLONOSCOPY (N/A)    Final Anesthesia Type: general  Patient location during evaluation: PACU  Patient participation: Yes- Able to Participate  Level of consciousness: awake  Post-procedure vital signs: reviewed and stable  Pain management: adequate  Airway patency: patent  PONV status at discharge: No PONV  Anesthetic complications: no      Cardiovascular status: blood pressure returned to baseline  Respiratory status: unassisted, spontaneous ventilation and room air  Hydration status: euvolemic  Follow-up not needed.        Visit Vitals  /70 (BP Location: Left arm, Patient Position: Lying)   Pulse 91   Temp 36.4 °C (97.5 °F) (Skin)   Resp 12   Ht 5' 10" (1.778 m)   Wt 105.7 kg (233 lb)   SpO2 99%   BMI 33.43 kg/m²       Pain/Cj Score: Pain Assessment Performed: Yes (11/15/2018 12:29 PM)  Presence of Pain: denies (11/15/2018 12:29 PM)  Cj Score: 10 (11/15/2018 12:29 PM)        "

## 2018-11-15 NOTE — [employer] PATIENT INSTRUCTIONS
Discharge Summary/Instructions after an Endoscopic Procedure  Patient Name: Brown García  Patient MRN: [id number]  Patient YOB: 1940  Thursday, November 15, 2018  Anibal Royal MD  RESTRICTIONS:  During your procedure today, you received medications for sedation.  These   medications may affect your judgment, balance and coordination.  Therefore,   for 24 hours, you have the following restrictions:   - DO NOT drive a car, operate machinery, make legal/financial decisions,   sign important papers or drink alcohol.    ACTIVITY:  Today: no heavy lifting, straining or running due to procedural   sedation/anesthesia.  The following day: return to full activity including work.  DIET:  Eat and drink normally unless instructed otherwise.     TREATMENT FOR COMMON SIDE EFFECTS:  - Mild abdominal pain, nausea, belching, bloating or excessive gas:  rest,   eat lightly and use a heating pad.  - Sore Throat: treat with throat lozenges and/or gargle with warm salt   water.  - Because air was used during the procedure, expelling large amounts of air   from your rectum or belching is normal.  - If a bowel prep was taken, you may not have a bowel movement for 1-3 days.    This is normal.  SYMPTOMS TO WATCH FOR AND REPORT TO YOUR PHYSICIAN:  1. Abdominal pain or bloating, other than gas cramps.  2. Chest pain.  3. Back pain.  4. Signs of infection such as: chills or fever occurring within 24 hours   after the procedure.  5. Rectal bleeding, which would show as bright red, maroon, or black stools.   (A tablespoon of blood from the rectum is not serious, especially if   hemorrhoids are present.)  6. Vomiting.  7. Weakness or dizziness.  GO DIRECTLY TO THE NEAREST EMERGENCY ROOM IF YOU HAVE ANY OF THE FOLLOWING:      Difficulty breathing              Chills and/or fever over 101 F   Persistent vomiting and/or vomiting blood   Severe abdominal pain   Severe chest pain   Black, tarry stools   Bleeding- more than one  tablespoon   Any other symptom or condition that you feel may need urgent attention  Your doctor recommends these additional instructions:  If any biopsies were taken, your doctors clinic will contact you in 1 to 2   weeks with any results.  We are waiting for your pathology results.   Continue your present medications.   You are being discharged to home.  For questions, problems or results please call your physician - Anibal Royal MD at Work: [phone].  EMERGENCY PHONE NUMBER: [phone], LAB RESULTS: [phone]  IF A COMPLICATION OR EMERGENCY SITUATION ARISES AND YOU ARE UNABLE TO REACH   YOUR PHYSICIAN - GO DIRECTLY TO THE EMERGENCY ROOM.  ___________________________________________  Nurse Signature  ___________________________________________  Patient/Designated Responsible Party Signature  Anibal Royal MD  11/15/2018 11:52:32 AM  This report has been verified and signed electronically.  [employer]

## 2018-11-15 NOTE — PLAN OF CARE
DR. GRIER TO BEDSIDE.  PAPERWORK FOR BRIDGET GIBSON DELIVERED.  WILL GIVE TO CARE GIVER IN ATTENDANCE ALONG WITH THE AVS

## 2018-11-15 NOTE — DISCHARGE INSTRUCTIONS
Recovery After Procedural Sedation (Adult)  You have been given medicine by vein to make you sleep during your surgery. This may have included both a pain medicine and sleeping medicine. Most of the effects have worn off. But you may still have some drowsiness for the next 6 to 8 hours.  Home care  Follow these guidelines when you get home:  · For the next 8 hours, you should be watched by a responsible adult. This person should make sure your condition is not getting worse.  · Don't drink any alcohol for the next 24 hours.  · Don't drive, operate dangerous machinery, or make important business or personal decisions during the next 24 hours.  Note: Your healthcare provider may tell you not to take any medicine by mouth for pain or sleep in the next 4 hours. These medicines may react with the medicines you were given in the hospital. This could cause a much stronger response than usual.  Follow-up care  Follow up with your healthcare provider if you are not alert and back to your usual level of activity within 12 hours.  When to seek medical advice  Call your healthcare provider right away if any of these occur:  · Drowsiness gets worse  · Weakness or dizziness gets worse  · Repeated vomiting  · You can't be awakened   Date Last Reviewed: 10/18/2016  © 9780-8010 [employer]. [address] [place] [address], [place] [address]. All rights reserved. This information is not intended as a substitute for professional medical care. Always follow your healthcare professional's instructions.

## 2018-11-15 NOTE — [employer] PATIENT INSTRUCTIONS
Discharge Summary/Instructions after an Endoscopic Procedure  Patient Name: Brown García  Patient MRN: [id number]  Patient YOB: 1940  Thursday, November 15, 2018  Anibal Royal MD  RESTRICTIONS:  During your procedure today, you received medications for sedation.  These   medications may affect your judgment, balance and coordination.  Therefore,   for 24 hours, you have the following restrictions:   - DO NOT drive a car, operate machinery, make legal/financial decisions,   sign important papers or drink alcohol.    ACTIVITY:  Today: no heavy lifting, straining or running due to procedural   sedation/anesthesia.  The following day: return to full activity including work.  DIET:  Eat and drink normally unless instructed otherwise.     TREATMENT FOR COMMON SIDE EFFECTS:  - Mild abdominal pain, nausea, belching, bloating or excessive gas:  rest,   eat lightly and use a heating pad.  - Sore Throat: treat with throat lozenges and/or gargle with warm salt   water.  - Because air was used during the procedure, expelling large amounts of air   from your rectum or belching is normal.  - If a bowel prep was taken, you may not have a bowel movement for 1-3 days.    This is normal.  SYMPTOMS TO WATCH FOR AND REPORT TO YOUR PHYSICIAN:  1. Abdominal pain or bloating, other than gas cramps.  2. Chest pain.  3. Back pain.  4. Signs of infection such as: chills or fever occurring within 24 hours   after the procedure.  5. Rectal bleeding, which would show as bright red, maroon, or black stools.   (A tablespoon of blood from the rectum is not serious, especially if   hemorrhoids are present.)  6. Vomiting.  7. Weakness or dizziness.  GO DIRECTLY TO THE NEAREST EMERGENCY ROOM IF YOU HAVE ANY OF THE FOLLOWING:      Difficulty breathing              Chills and/or fever over 101 F   Persistent vomiting and/or vomiting blood   Severe abdominal pain   Severe chest pain   Black, tarry stools   Bleeding- more than one  tablespoon   Any other symptom or condition that you feel may need urgent attention  Your doctor recommends these additional instructions:  If any biopsies were taken, your doctors clinic will contact you in 1 to 2   weeks with any results.  Your physician has indicated that a repeat colonoscopy is not recommended   for surveillance.   You are being discharged to home.  For questions, problems or results please call your physician - Anibal Royal MD at Work: [phone].  EMERGENCY PHONE NUMBER: [phone], LAB RESULTS: [phone]  IF A COMPLICATION OR EMERGENCY SITUATION ARISES AND YOU ARE UNABLE TO REACH   YOUR PHYSICIAN - GO DIRECTLY TO THE EMERGENCY ROOM.  ___________________________________________  Nurse Signature  ___________________________________________  Patient/Designated Responsible Party Signature  Anibal Royal MD  11/15/2018 12:14:41 PM  This report has been verified and signed electronically.  [employer]

## 2018-11-15 NOTE — DISCHARGE SUMMARY
Discharge Note  Short Stay      SUMMARY     Admit Date: 11/15/2018    Attending Physician: Anibal Royal MD     Discharge Physician: Anibal Royal MD    Discharge Date: 11/15/2018 12:15 PM    Final Diagnosis: Nausea and vomiting, intractability of vomiting not specified, unspecified vomiting type [R11.2]  Gastroparesis [K31.84]  Hx of colonic polyps [Z86.010]    Disposition: HOME OR SELF CARE    Patient Instructions:   Current Discharge Medication List      START taking these medications    Details   ranitidine (ZANTAC) 300 MG tablet Take 1 tablet (300 mg total) by mouth every evening.  Qty: 30 tablet, Refills: 2         CONTINUE these medications which have NOT CHANGED    Details   aspirin (ECOTRIN) 81 MG EC tablet Take 81 mg by mouth once daily.      atorvastatin (LIPITOR) 40 MG tablet       busPIRone (BUSPAR) 2.5 MG Tab tablet Take 2.5 mg by mouth 2 (two) times daily.      butalbital-acetaminophen-caffeine -40 mg (FIORICET, ESGIC) -40 mg per tablet Take 1 tablet by mouth daily as needed for Pain.      carvedilol (COREG) 6.25 MG tablet Take 6.25 mg by mouth 2 (two) times daily with meals.      cholecalciferol, vitamin D3, (VITAMIN D3) 1,000 unit Chew Take 1 tablet by mouth once daily.      cyanocobalamin (VITAMIN B-12) 1000 MCG tablet Take 1,000 mcg by mouth once daily.       docusate sodium (COLACE) 100 MG capsule Take 100 mg by mouth 2 (two) times daily.      escitalopram oxalate (LEXAPRO) 10 MG tablet Take 10 mg by mouth once daily.      fenofibrate micronized (LOFIBRA) 200 MG Cap Take 200 mg by mouth daily with breakfast.      ferrous sulfate 325 (65 FE) MG EC tablet Take 325 mg by mouth 2 (two) times daily.      furosemide (LASIX) 20 MG tablet       levothyroxine (SYNTHROID) 137 MCG Tab tablet Take 137 mcg by mouth before breakfast.      lisinopril (PRINIVIL,ZESTRIL) 5 MG tablet       lubiprostone (AMITIZA) 24 MCG Cap Take 24 mcg by mouth every 12 (twelve) hours as needed.       magnesium hydroxide 400 mg/5 ml (MILK OF MAGNESIA) 400 mg/5 mL Susp Take 30 mLs by mouth daily as needed.      melatonin 10 mg Tab Take 10 mg by mouth every evening.       memantine (NAMENDA) 10 MG Tab       multivitamin capsule Take 1 capsule by mouth once daily.      temazepam (RESTORIL) 15 mg Cap Take 15 mg by mouth every evening.      ondansetron (ZOFRAN) 8 MG tablet       ondansetron (ZOFRAN-ODT) 4 MG TbDL Take 8 mg by mouth every 8 (eight) hours as needed.      polyethylene glycol (GLYCOLAX) 17 gram/dose powder Take 17 g by mouth once daily.       sodium chloride (SALINE NASAL MIST) 0.65 % nasal spray 1 spray by Nasal route every 4 (four) hours.  Refills: 12             Discharge Procedure Orders (must include Diet, Follow-up, Activity)    Follow Up:  Follow up with PCP as previously scheduled  Resume routine diet.  Activity as tolerated.    No driving day of procedure.

## 2018-11-15 NOTE — ANESTHESIA PREPROCEDURE EVALUATION
11/15/2018  Brown García is a 78 y.o., male.    Anesthesia Evaluation    I have reviewed the Patient Summary Reports.    I have reviewed the Nursing Notes.   I have reviewed the Medications.     Review of Systems  Anesthesia Hx:  No problems with previous Anesthesia Denies Hx of Anesthetic complications Phone consent needed for today  11/15/2018   Social:  Non-Smoker Smoking Status: Never Smoker  Smokeless Tobacco Status: Never Used  Alcohol use: No  Drug use: No       Cardiovascular:   Hypertension Denies MI. CAD   CABG/stent   Denies Angina.    Pulmonary:   Denies COPD.  Denies Asthma.  Denies Recent URI.    Renal/:   Denies Chronic Renal Disease.     Hepatic/GI:   GERD, poorly controlled Denies Liver Disease. Gastroparesis   Neurological:   Denies TIA. CVA, residual symptoms Denies Seizures. Dizziness and giddiness    Cognitive communication deficit Dementia moderate  CVA - Cerebrovasular Accident  Movement Disorder Dx, Parkinson's Disease   Endocrine:   Diabetes, poorly controlled, type 2 Denies Hypothyroidism.    Psych:   Psychiatric History          Physical Exam  General:  Obesity, Well nourished    Airway/Jaw/Neck:  Airway Findings: Mouth Opening: Normal Tongue: Normal  General Airway Assessment: Adult, Good  Mallampati: II  Improves to II with phonation.  TM Distance: 4-6 cm      Dental:  Dental Findings: In tact   Chest/Lungs:  Chest/Lungs Findings: Clear to auscultation, Normal Respiratory Rate     Heart/Vascular:  Heart Findings: Rate: Normal  Rhythm: Regular Rhythm  Sounds: Normal  Heart murmur: negative       Mental Status:  Mental Status Findings:  Cooperative, Alert and Oriented         Anesthesia Plan  Type of Anesthesia, risks & benefits discussed:  Anesthesia Type:  general  Patient's Preference:   Intra-op Monitoring Plan: standard ASA monitors  Intra-op Monitoring Plan Comments:   Post  Op Pain Control Plan:   Post Op Pain Control Plan Comments:   Induction:   IV  Beta Blocker:  Patient is on a Beta-Blocker and has received one dose within the past 24 hours (No further documentation required).       Informed Consent: Patient understands risks and agrees with Anesthesia plan.  Questions answered. Anesthesia consent signed with patient.  ASA Score: 3     Day of Surgery Review of History & Physical: I have interviewed and examined the patient. I have reviewed the patient's H&P dated:  There are no significant changes.  H&P update referred to the surgeon.         Ready For Surgery From Anesthesia Perspective.

## 2019-04-01 PROBLEM — N30.00 ACUTE CYSTITIS: Status: ACTIVE | Noted: 2019-01-01

## 2019-04-01 PROBLEM — A41.9 SEPSIS: Status: ACTIVE | Noted: 2019-01-01

## 2019-04-01 PROBLEM — E03.9 HYPOTHYROID: Status: ACTIVE | Noted: 2019-01-01

## 2019-04-01 PROBLEM — N17.9 ACUTE RENAL FAILURE SUPERIMPOSED ON STAGE 3 CHRONIC KIDNEY DISEASE: Status: ACTIVE | Noted: 2019-01-01

## 2019-04-01 PROBLEM — N18.30 ACUTE RENAL FAILURE SUPERIMPOSED ON STAGE 3 CHRONIC KIDNEY DISEASE: Status: ACTIVE | Noted: 2019-01-01

## 2019-05-08 PROBLEM — N39.0 URINARY TRACT INFECTION WITH HEMATURIA: Status: ACTIVE | Noted: 2019-01-01

## 2019-05-08 PROBLEM — L89.153 SACRAL DECUBITUS ULCER, STAGE III: Status: ACTIVE | Noted: 2019-01-01

## 2019-05-08 PROBLEM — R31.9 URINARY TRACT INFECTION WITH HEMATURIA: Status: ACTIVE | Noted: 2019-01-01

## 2019-05-08 PROBLEM — R31.0 GROSS HEMATURIA: Status: ACTIVE | Noted: 2019-01-01

## 2019-05-08 PROBLEM — N39.0 URINARY TRACT INFECTION ASSOCIATED WITH CATHETERIZATION OF URINARY TRACT: Status: ACTIVE | Noted: 2019-01-01

## 2019-05-08 PROBLEM — T83.511A URINARY TRACT INFECTION ASSOCIATED WITH CATHETERIZATION OF URINARY TRACT: Status: ACTIVE | Noted: 2019-01-01

## 2019-05-09 PROBLEM — R31.9 URINARY TRACT INFECTION WITH HEMATURIA: Status: RESOLVED | Noted: 2019-01-01 | Resolved: 2019-01-01

## 2019-05-09 PROBLEM — N39.0 URINARY TRACT INFECTION WITH HEMATURIA: Status: RESOLVED | Noted: 2019-01-01 | Resolved: 2019-01-01

## 2023-11-02 NOTE — H&P
History & Physical - Short Stay  Gastroenterology      SUBJECTIVE:     Procedure: Colonoscopy and EGD    Chief Complaint/Indication for Procedure: Previous Polyps and N/V.    PTA Medications   Medication Sig    aspirin (ECOTRIN) 81 MG EC tablet Take 81 mg by mouth once daily.    atorvastatin (LIPITOR) 40 MG tablet     busPIRone (BUSPAR) 2.5 MG Tab tablet Take 2.5 mg by mouth 2 (two) times daily.    butalbital-acetaminophen-caffeine -40 mg (FIORICET, ESGIC) -40 mg per tablet Take 1 tablet by mouth daily as needed for Pain.    carvedilol (COREG) 6.25 MG tablet Take 6.25 mg by mouth 2 (two) times daily with meals.    cholecalciferol, vitamin D3, (VITAMIN D3) 1,000 unit Chew Take 1 tablet by mouth once daily.    cyanocobalamin (VITAMIN B-12) 1000 MCG tablet Take 1,000 mcg by mouth once daily.     docusate sodium (COLACE) 100 MG capsule Take 100 mg by mouth 2 (two) times daily.    escitalopram oxalate (LEXAPRO) 10 MG tablet Take 10 mg by mouth once daily.    fenofibrate micronized (LOFIBRA) 200 MG Cap Take 200 mg by mouth daily with breakfast.    ferrous sulfate 325 (65 FE) MG EC tablet Take 325 mg by mouth 2 (two) times daily.    furosemide (LASIX) 20 MG tablet     levothyroxine (SYNTHROID) 137 MCG Tab tablet Take 137 mcg by mouth before breakfast.    lisinopril (PRINIVIL,ZESTRIL) 5 MG tablet     lubiprostone (AMITIZA) 24 MCG Cap Take 24 mcg by mouth every 12 (twelve) hours as needed.    magnesium hydroxide 400 mg/5 ml (MILK OF MAGNESIA) 400 mg/5 mL Susp Take 30 mLs by mouth daily as needed.    melatonin 10 mg Tab Take 10 mg by mouth every evening.     memantine (NAMENDA) 10 MG Tab     multivitamin capsule Take 1 capsule by mouth once daily.    temazepam (RESTORIL) 15 mg Cap Take 15 mg by mouth every evening.    ondansetron (ZOFRAN) 8 MG tablet     ondansetron (ZOFRAN-ODT) 4 MG TbDL Take 8 mg by mouth every 8 (eight) hours as needed.    polyethylene glycol (GLYCOLAX) 17 gram/dose  Pt meets criteria for moderate malnutrition in context of acute illness  powder Take 17 g by mouth once daily.     sodium chloride (SALINE NASAL MIST) 0.65 % nasal spray 1 spray by Nasal route every 4 (four) hours. (Patient taking differently: 1 spray by Nasal route 4 (four) times daily. )       Review of patient's allergies indicates:   Allergen Reactions    Pcn [penicillins]         Past Medical History:   Diagnosis Date    Anxiety     Cognitive communication deficit     Colon polyp     Coronary atherosclerosis of native coronary artery 3/27/2013    9/2000. CABG. 2/24/2005. [facility] [place]. 3V CAD. Patent LIMA to LAD. Patent SVG to OM1. Occluded RCA. LVEF 65%.    Depression     Diabetes mellitus type II 3/27/2013    Diagnosed 2002.    Dizziness and giddiness     Essential hypertension, benign 3/27/2013    Diagnosed 1990.    Frequent falls     Gastroparesis     GERD (gastroesophageal reflux disease)     Hx of CABG 3/27/2013    9/2000. CABG. 2/24/2005. [facility] [place]. 3V CAD. Patent LIMA to LAD. Patent SVG to OM1. Occluded RCA. LVEF 65%.    Hypercholesterolemia 3/27/2013    Incontinence of urine     Insomnia     Irritable bowel syndrome (IBS)     Parkinson's disease     Requires daily assistance for activities of daily living (ADL) and comfort needs     Stroke     thrombos    Thyroid disease     Unsteady gait      Past Surgical History:   Procedure Laterality Date    CARDIAC CATHETERIZATION      COLONOSCOPY  09/02/2003    GIULIA Fern[facility]dez, in legacy: colon polyps removed, poor prep, hemorrhoids; repeat in 1 year, biopsy: adenomatous polyps    CORONARY ARTERY BYPASS GRAFT      HAND SURGERY  2009    HEART CATH-LEFT Left 1/16/2017    [facility] CATH    KNEE SURGERY  1/2013    Left TKR    [id number] # [id number] Left 1/18/2017    Performed by Fausto Miner MD at [facility] CATH     Family History   Problem Relation Age of Onset    Colon cancer Neg Hx     Colon polyps Neg Hx     Crohn's disease Neg Hx     Ulcerative colitis Neg Hx      Stomach cancer Neg Hx     Esophageal cancer Neg Hx      Social History     Tobacco Use    Smoking status: Never Smoker    Smokeless tobacco: Never Used   Substance Use Topics    Alcohol use: No    Drug use: No         OBJECTIVE:     Vital Signs (Most Recent)  Temp: 97.9 °F (36.6 °C) (11/15/18 1121)  Pulse: 71 (11/15/18 1121)  Resp: 16 (11/15/18 1121)  BP: 129/70 (11/15/18 1121)  SpO2: 98 % (11/15/18 1121)    Physical Exam:                                                       GENERAL:  Comfortable, in no acute distress.                                 HEENT EXAM:  Nonicteric.  No adenopathy.  Oropharynx is clear.               NECK:  Supple.                                                               LUNGS:  Clear.                                                               CARDIAC:  Regular rate and rhythm.  S1, S2.  No murmur.                      ABDOMEN:  Soft, positive bowel sounds, nontender.  No hepatosplenomegaly or masses.  No rebound or guarding.                                             EXTREMITIES:  No edema.     MENTAL STATUS:  Alert, Dementia.      ASSESSMENT/PLAN:     Assessment: Previous Polyps and N/V    Plan: Colonoscopy and EGD    Anesthesia Plan: General    ASA Grade: ASA 3 - Patient with moderate systemic disease with functional limitations    MALLAMPATI SCORE:  I (soft palate, uvula, fauces, and tonsillar pillars visible)